# Patient Record
Sex: FEMALE | Race: WHITE | NOT HISPANIC OR LATINO | Employment: FULL TIME | URBAN - METROPOLITAN AREA
[De-identification: names, ages, dates, MRNs, and addresses within clinical notes are randomized per-mention and may not be internally consistent; named-entity substitution may affect disease eponyms.]

---

## 2017-01-02 PROBLEM — E78.2 MIXED HYPERLIPIDEMIA: Status: ACTIVE | Noted: 2017-01-02

## 2017-09-29 ENCOUNTER — RADIANT APPOINTMENT (OUTPATIENT)
Dept: MAMMOGRAPHY | Facility: CLINIC | Age: 47
End: 2017-09-29
Payer: COMMERCIAL

## 2017-09-29 DIAGNOSIS — Z12.31 VISIT FOR SCREENING MAMMOGRAM: ICD-10-CM

## 2017-09-29 PROCEDURE — G0202 SCR MAMMO BI INCL CAD: HCPCS | Mod: TC

## 2017-12-12 ENCOUNTER — TELEPHONE (OUTPATIENT)
Dept: FAMILY MEDICINE | Facility: CLINIC | Age: 47
End: 2017-12-12

## 2017-12-12 NOTE — TELEPHONE ENCOUNTER
Rosa coming in on 12/19/17 for annual physical.  She wants her glucose tested, however provider deems it necessary (A 1c, vs regular glucose level).  Her concern is making sure this is done, as well as coded correctly.  Her insurance will cover all labs as long as they are considered preventative.   Her appt is at 07:30 and she does plan on coming in fasting.    Thanks  Asuncion Huitron RN  FNA

## 2017-12-12 NOTE — TELEPHONE ENCOUNTER
Will check her glucose.  Her last one was normal (less than 100) so an A1c would not be necessary and not covered by insurance because she does not have a diagnosis of abnormal fasting glucose.

## 2017-12-19 ENCOUNTER — OFFICE VISIT (OUTPATIENT)
Dept: FAMILY MEDICINE | Facility: CLINIC | Age: 47
End: 2017-12-19
Payer: COMMERCIAL

## 2017-12-19 VITALS
DIASTOLIC BLOOD PRESSURE: 68 MMHG | HEIGHT: 68 IN | TEMPERATURE: 97.9 F | OXYGEN SATURATION: 98 % | BODY MASS INDEX: 25.31 KG/M2 | RESPIRATION RATE: 16 BRPM | HEART RATE: 72 BPM | SYSTOLIC BLOOD PRESSURE: 110 MMHG | WEIGHT: 167 LBS

## 2017-12-19 DIAGNOSIS — Z30.41 ORAL CONTRACEPTIVE USE: ICD-10-CM

## 2017-12-19 DIAGNOSIS — E78.2 MIXED HYPERLIPIDEMIA: ICD-10-CM

## 2017-12-19 DIAGNOSIS — Z00.00 ROUTINE GENERAL MEDICAL EXAMINATION AT A HEALTH CARE FACILITY: Primary | ICD-10-CM

## 2017-12-19 DIAGNOSIS — E03.9 ACQUIRED HYPOTHYROIDISM: Chronic | ICD-10-CM

## 2017-12-19 LAB
ANION GAP SERPL CALCULATED.3IONS-SCNC: 11 MMOL/L (ref 3–14)
BUN SERPL-MCNC: 15 MG/DL (ref 7–30)
CALCIUM SERPL-MCNC: 8.8 MG/DL (ref 8.5–10.1)
CHLORIDE SERPL-SCNC: 109 MMOL/L (ref 94–109)
CHOLEST SERPL-MCNC: 203 MG/DL
CO2 SERPL-SCNC: 20 MMOL/L (ref 20–32)
CREAT SERPL-MCNC: 0.59 MG/DL (ref 0.52–1.04)
GFR SERPL CREATININE-BSD FRML MDRD: >90 ML/MIN/1.7M2
GLUCOSE SERPL-MCNC: 85 MG/DL (ref 70–99)
HDLC SERPL-MCNC: 56 MG/DL
LDLC SERPL CALC-MCNC: 115 MG/DL
NONHDLC SERPL-MCNC: 147 MG/DL
POTASSIUM SERPL-SCNC: 4 MMOL/L (ref 3.4–5.3)
SODIUM SERPL-SCNC: 140 MMOL/L (ref 133–144)
TRIGL SERPL-MCNC: 161 MG/DL
TSH SERPL DL<=0.005 MIU/L-ACNC: 1.96 MU/L (ref 0.4–4)

## 2017-12-19 PROCEDURE — 99396 PREV VISIT EST AGE 40-64: CPT | Performed by: PHYSICIAN ASSISTANT

## 2017-12-19 PROCEDURE — 36415 COLL VENOUS BLD VENIPUNCTURE: CPT | Performed by: PHYSICIAN ASSISTANT

## 2017-12-19 PROCEDURE — 80061 LIPID PANEL: CPT | Performed by: PHYSICIAN ASSISTANT

## 2017-12-19 PROCEDURE — 84443 ASSAY THYROID STIM HORMONE: CPT | Performed by: PHYSICIAN ASSISTANT

## 2017-12-19 PROCEDURE — 80048 BASIC METABOLIC PNL TOTAL CA: CPT | Performed by: PHYSICIAN ASSISTANT

## 2017-12-19 RX ORDER — LEVOTHYROXINE SODIUM 137 UG/1
137 TABLET ORAL DAILY
Qty: 90 TABLET | Refills: 4 | Status: SHIPPED | OUTPATIENT
Start: 2017-12-19 | End: 2018-12-09

## 2017-12-19 NOTE — PROGRESS NOTES
Lab letter printed and signed.  Message comments below:  - Your lab results look great; everything is normal.  - Your cholesterol is high but the American Heart Association does not recommend starting a medication to lower this at this time.  We will recheck next year.  - Your metabolic panel shows:  normal electrolytes (sodium, potassium, calcium), kidney function (creatinine and GFR) and fasting blood sugar.  - Your TSH was normal, indicating that you are on the correct dose of thyroid medication.  I sent additional refills of your current medication dose to your pharmacy.

## 2017-12-19 NOTE — MR AVS SNAPSHOT
After Visit Summary   12/19/2017    Rosa Urbina    MRN: [id number]           Patient Information     Date Of Birth          1970        Visit Information        Provider Department      12/19/2017 7:30 AM Sondra Tran PA-C [facility]        Today's Diagnoses     Routine general medical examination at a health care facility    -  1    Acquired hypothyroidism        Mixed hyperlipidemia        Oral contraceptive use        Need for prophylactic vaccination and inoculation against influenza          Care Instructions      Preventive Health Recommendations  Female Ages 40 to 49    Yearly exam:     See your health care provider every year in order to  1. Review health changes.   2. Discuss preventive care.    3. Review your medicines if your doctor prescribed any.      Get a Pap test every three years (unless you have an abnormal result and your provider advises testing more often).      If you get Pap tests with HPV test, you only need to test every 5 years, unless you have an abnormal result. You do not need a Pap test if your uterus was removed (hysterectomy) and you have not had cancer.      You should be tested each year for STDs (sexually transmitted diseases), if you're at risk.       Ask your doctor if you should have a mammogram.      Have a colonoscopy (test for colon cancer) if someone in your family has had colon cancer or polyps before age 50.       Have a cholesterol test every 5 years.       Have a diabetes test (fasting glucose) after age 45. If you are at risk for diabetes, you should have this test every 3 years.    Shots: Get a flu shot each year. Get a tetanus shot every 10 years.     Nutrition:     Eat at least 5 servings of fruits and vegetables each day.    Eat whole-grain bread, whole-wheat pasta and brown rice instead of white grains and rice.    Talk to your provider about Calcium and Vitamin D.     Lifestyle    Exercise  "at least 150 minutes a week (an average of 30 minutes a day, 5 days a week). This will help you control your weight and prevent disease.    Limit alcohol to one drink per day.    No smoking.     Wear sunscreen to prevent skin cancer.    See your dentist every six months for an exam and cleaning.          Follow-ups after your visit        Who to contact     If you have questions or need follow up information about today's clinic visit or your schedule please contact [facility] directly at [phone].  Normal or non-critical lab and imaging results will be communicated to you by Synforahart, letter or phone within 4 business days after the clinic has received the results. If you do not hear from us within 7 days, please contact the clinic through MobiTVt or phone. If you have a critical or abnormal lab result, we will notify you by phone as soon as possible.  Submit refill requests through What's Trending or call your pharmacy and they will forward the refill request to us. Please allow 3 business days for your refill to be completed.          Additional Information About Your Visit        What's Trending Information     What's Trending lets you send messages to your doctor, view your test results, renew your prescriptions, schedule appointments and more. To sign up, go to [email or web address] . Click on \"Log in\" on the left side of the screen, which will take you to the Welcome page. Then click on \"Sign up Now\" on the right side of the page.     You will be asked to enter the access code listed below, as well as some personal information. Please follow the directions to create your username and password.     Your access code is: [email or web address]  Expires: 3/19/2018  7:52 AM     Your access code will  in 90 days. If you need help or a new code, please call your [facility] or [phone].        Care EveryWhere ID     This is your Care EveryWhere ID. This could be used by other organizations to access " "your [place] medical records  [id number]        Your Vitals Were     Pulse Temperature Respirations Height Pulse Oximetry BMI (Body Mass Index)    72 97.9  F (36.6  C) (Tympanic) 16 5' 7.5\" (1.715 m) 98% 25.77 kg/m2       Blood Pressure from Last 3 Encounters:   12/19/17 110/68   12/30/16 130/78   03/09/16 109/65    Weight from Last 3 Encounters:   12/19/17 167 lb (75.8 kg)   12/30/16 164 lb (74.4 kg)   10/30/15 147 lb (66.7 kg)              We Performed the Following     Basic metabolic panel     Lipid panel reflex to direct LDL Fasting     TSH with free T4 reflex        Primary Care Provider Fax #    Physician No Ref-Primary [phone]       No address on file        Equal Access to Services     EARLINE TRAN : Sarai Jones, washira oquendo, oumou kaalgraciela alcala, ted nieves . So [facility] [phone].    ATENCIÓN: Si habla español, tiene a soler disposición servicios gratuitos de asistencia lingüística. Maryann al [phone].    We comply with applicable federal civil rights laws and [place] laws. We do not discriminate on the basis of race, color, national origin, age, disability, sex, sexual orientation, or gender identity.            Thank you!     Thank you for choosing [facility]  for your care. Our goal is always to provide you with excellent care. Hearing back from our patients is one way we can continue to improve our services. Please take a few minutes to complete the written survey that you may receive in the mail after your visit with us. Thank you!             Your Updated Medication List - Protect others around you: Learn how to safely use, store and throw away your medicines at www.disposemymeds.org.          This list is accurate as of: 12/19/17  7:52 AM.  Always use your most recent med list.                   Brand Name Dispense Instructions for use Diagnosis    DESOGEN 0.15-30 MG-MCG per tablet   Generic drug:  " desogestrel-ethinyl estradiol     3 Package    Take 1 tablet by mouth daily    Oral contraceptive use       levothyroxine 137 MCG tablet    SYNTHROID/LEVOTHROID    90 tablet    Take 1 tablet (137 mcg) by mouth daily    Acquired hypothyroidism

## 2017-12-19 NOTE — PROGRESS NOTES
SUBJECTIVE:   CC: Rosa Urbina is an 47 year old woman who presents for preventive health visit.     Healthy Habits:    Do you get at least three servings of calcium containing foods daily (dairy, green leafy vegetables, etc.)? Maybe 2-3-lactose intolerant     Amount of exercise or daily activities, outside of work: last month before traveling was doing 3 times per week    Problems taking medications regularly No    Medication side effects: No    Have you had an eye exam in the past two years? yes    Do you see a dentist twice per year? yes    Do you have sleep apnea, excessive snoring or daytime drowsiness?yes      Didn't think she took her thyroid medication this morning.  May have taken it twice today.      Had mammogram yearly, mother had breast cancer before age 60.  Still taking OCP, periods have normal but have not been.        Today's PHQ-2 Score:   PHQ-2 ( 1999 Pfizer) 12/19/2017 12/30/2016   Q1: Little interest or pleasure in doing things 0 0   Q2: Feeling down, depressed or hopeless 0 0   PHQ-2 Score 0 0         Abuse: Current or Past(Physical, Sexual or Emotional)- No  Do you feel safe in your environment - Yes  Social History   Substance Use Topics     Smoking status: Never Smoker     Smokeless tobacco: Never Used     Alcohol use Yes      Comment: rarely     If you drink alcohol do you typically have >3 drinks per day or >7 drinks per week? No                     Reviewed orders with patient.  Reviewed health maintenance and updated orders accordingly - Yes  BP Readings from Last 3 Encounters:   12/19/17 110/68   12/30/16 130/78   03/09/16 109/65    Wt Readings from Last 3 Encounters:   12/19/17 167 lb (75.8 kg)   12/30/16 164 lb (74.4 kg)   10/30/15 147 lb (66.7 kg)               Recent Labs   Lab Test  12/30/16   0758  10/30/15   0812  12/12/14   0809  12/19/13   0741  12/10/13   0949   LDL  130*  93  92  136*  ORDERED AS FUTURE   HDL  57  51  43*  54  ORDERED AS FUTURE   TRIG  163*  159*   "187*  147  ORDERED AS FUTURE   ALT   --    --    --   20  ORDERED AS FUTURE   CR   --    --    --   0.90  ORDERED AS FUTURE   GFRESTIMATED   --    --    --   68  ORDERED AS FUTURE   GFRESTBLACK   --    --    --   82  ORDERED AS FUTURE   POTASSIUM   --    --    --   3.6  ORDERED AS FUTURE   TSH  2.73  0.53  1.70  5.41*  ORDERED AS FUTURE        Patient under age 50, mutual decision reflected in health maintenance.        Pertinent mammograms are reviewed under the imaging tab.  History of abnormal Pap smear:   NO - age 30-65 PAP every 5 years with negative HPV co-testing recommended  Last 3 Pap and HPV Results:   PAP / HPV 12/12/2014   PAP NIL       Reviewed and updated as needed this visit by clinical staff  Tobacco  Allergies  Meds  Problems  Med Hx  Surg Hx  Fam Hx  Soc Hx          Reviewed and updated as needed this visit by Provider  Tobacco  Allergies  Problems  Med Hx  Surg Hx  Fam Hx  Soc Hx         ROS:C: NEGATIVE for fever, chills, change in weight  I: NEGATIVE for worrisome rashes, moles or lesions  E: NEGATIVE for vision changes or irritation  ENT: NEGATIVE for ear, mouth and throat problems  R: NEGATIVE for significant cough or SOB  B: NEGATIVE for masses, tenderness or discharge  CV: NEGATIVE for chest pain, palpitations or peripheral edema  GI: NEGATIVE for nausea, abdominal pain, heartburn, or change in bowel habits  : NEGATIVE for unusual urinary or vaginal symptoms. Periods are regular.  M: NEGATIVE for significant arthralgias or myalgia  N: NEGATIVE for weakness, dizziness or paresthesias  E: NEGATIVE for temperature intolerance, skin/hair changes  P: NEGATIVE for changes in mood or affect    OBJECTIVE:   /68  Pulse 72  Temp 97.9  F (36.6  C) (Tympanic)  Resp 16  Ht 5' 7.5\" (1.715 m)  Wt 167 lb (75.8 kg)  SpO2 98%  BMI 25.77 kg/m2  EXAM:  GENERAL: healthy, alert and no distress  EYES: Eyes grossly normal to inspection, PERRL and conjunctivae and sclerae normal  HENT: ear " canals and TM's normal, nose and mouth without ulcers or lesions  NECK: no adenopathy, no asymmetry, masses, or scars and thyroid normal to palpation  RESP: lungs clear to auscultation - no rales, rhonchi or wheezes  BREAST: normal without masses, tenderness or nipple discharge and no palpable axillary masses or adenopathy  CV: regular rate and rhythm, normal S1 S2, no S3 or S4, no murmur, click or rub, no peripheral edema and peripheral pulses strong  ABDOMEN: soft, nontender, no hepatosplenomegaly, no masses and bowel sounds normal   (female): normal female external genitalia, normal urethral meatus, vaginal mucosa pink, moist, well rugated, and normal cervix/adnexa/uterus without masses or discharge  MS: no gross musculoskeletal defects noted, no edema  SKIN: no suspicious lesions or rashes  NEURO: Normal strength and tone, mentation intact and speech normal  PSYCH: mentation appears normal, affect normal/bright    ASSESSMENT/PLAN:       ICD-10-CM    1. Routine general medical examination at a health care facility Z00.00 Basic metabolic panel     Lipid panel reflex to direct LDL Fasting   2. Acquired hypothyroidism E03.9 TSH with free T4 reflex   3. Mixed hyperlipidemia E78.2    4. Oral contraceptive use Z30.41 DESOGEN 0.15-30 MG-MCG per tablet   5. Need for prophylactic vaccination and inoculation against influenza Z23        May need to check hormones for menopause next year, periods are still pretty regular on the pill.  [place]CP [place] so does not need refill.    Declines flu shot.    Thinks she may have taken 2 of her thyroid medications this am so if TSH is low she would rather we recheck it in a week than change her dose.  Discussed 1 extra dose is not likely to change her labs.  Will put in refills when labs return.    COUNSELING:   Reviewed preventive health counseling, as reflected in patient instructions       reports that she has never smoked. She has never used smokeless tobacco.    Estimated body  "mass index is 25.77 kg/(m^2) as calculated from the following:    Height as of this encounter: 5' 7.5\" (1.715 m).    Weight as of this encounter: 167 lb (75.8 kg).   Weight management plan: : -30 minutes of exercise 5 days a week (walking, jogging, housework, biking).  - Limit portion sizes and avoid eating out.  -Eat at least 5 servings of fruits and vegetables daily.   -Eat whole-grain bread, whole-wheat pasta and brown rice instead of white grains and rice.      Counseling Resources:  ATP IV Guidelines  Pooled Cohorts Equation Calculator  Breast Cancer Risk Calculator  FRAX Risk Assessment  ICSI Preventive Guidelines  Dietary Guidelines for Americans, 2010  USDA's MyPlate  ASA Prophylaxis  Lung CA Screening    Sondra Tran PA-C  [facility]  "

## 2017-12-19 NOTE — NURSING NOTE
"Chief Complaint   Patient presents with     Physical       Initial /68  Pulse 72  Temp 97.9  F (36.6  C) (Tympanic)  Resp 16  Ht 5' 7.5\" (1.715 m)  Wt 167 lb (75.8 kg)  SpO2 98%  BMI 25.77 kg/m2 Estimated body mass index is 25.77 kg/(m^2) as calculated from the following:    Height as of this encounter: 5' 7.5\" (1.715 m).    Weight as of this encounter: 167 lb (75.8 kg).  Medication Reconciliation: complete    "

## 2017-12-19 NOTE — LETTER
December 19, 2017      Rosa Urbina  [address]  [place] [address]        Dear ,    We are writing to inform you of your test results.    - Your lab results look great; everything is normal.  - Your cholesterol is high but the American Heart Association does not recommend starting a medication to lower this at this time.  We will recheck next year.  - Your metabolic panel shows:  normal electrolytes (sodium, potassium, calcium), kidney function (creatinine and GFR) and fasting blood sugar.  - Your TSH was normal, indicating that you are on the correct dose of thyroid medication.  I sent additional refills of your current medication dose to your pharmacy.      Resulted Orders   Basic metabolic panel   Result Value Ref Range    Sodium 140 133 - 144 mmol/L    Potassium 4.0 3.4 - 5.3 mmol/L    Chloride 109 94 - 109 mmol/L    Carbon Dioxide 20 20 - 32 mmol/L    Anion Gap 11 3 - 14 mmol/L    Glucose 85 70 - 99 mg/dL      Comment:      Fasting specimen    Urea Nitrogen 15 7 - 30 mg/dL    Creatinine 0.59 0.52 - 1.04 mg/dL    GFR Estimate >90 >60 mL/min/1.7m2      Comment:      Non  GFR Calc    GFR Estimate If Black >90 >60 mL/min/1.7m2      Comment:       GFR Calc    Calcium 8.8 8.5 - 10.1 mg/dL   Lipid panel reflex to direct LDL Fasting   Result Value Ref Range    Cholesterol 203 (H) <200 mg/dL      Comment:      Desirable:       <200 mg/dl    Triglycerides 161 (H) <150 mg/dL      Comment:      Borderline high:  150-199 mg/dl  High:             200-499 mg/dl  Very high:       >499 mg/dl  Fasting specimen      HDL Cholesterol 56 >49 mg/dL    LDL Cholesterol Calculated 115 (H) <100 mg/dL      Comment:      Above desirable:  100-129 mg/dl  Borderline High:  130-159 mg/dL  High:             160-189 mg/dL  Very high:       >189 mg/dl      Non HDL Cholesterol 147 (H) <130 mg/dL      Comment:      Above Desirable:  130-159 mg/dl  Borderline high:  160-189  mg/dl  High:             190-219 mg/dl  Very high:       >219 mg/dl     TSH with free T4 reflex   Result Value Ref Range    TSH 1.96 0.40 - 4.00 mU/L       If you have any questions or concerns, please call the clinic at the number listed above.       Sincerely,        Sondra Tran PA-C

## 2018-12-03 ENCOUNTER — HEALTH MAINTENANCE LETTER (OUTPATIENT)
Age: 48
End: 2018-12-03

## 2018-12-07 ENCOUNTER — OFFICE VISIT (OUTPATIENT)
Dept: FAMILY MEDICINE | Facility: CLINIC | Age: 48
End: 2018-12-07
Payer: COMMERCIAL

## 2018-12-07 VITALS
SYSTOLIC BLOOD PRESSURE: 110 MMHG | HEART RATE: 76 BPM | BODY MASS INDEX: 25.31 KG/M2 | DIASTOLIC BLOOD PRESSURE: 66 MMHG | WEIGHT: 164 LBS | OXYGEN SATURATION: 98 % | TEMPERATURE: 98 F

## 2018-12-07 DIAGNOSIS — Z00.00 ROUTINE GENERAL MEDICAL EXAMINATION AT A HEALTH CARE FACILITY: Primary | ICD-10-CM

## 2018-12-07 DIAGNOSIS — Z12.31 VISIT FOR SCREENING MAMMOGRAM: ICD-10-CM

## 2018-12-07 DIAGNOSIS — Z12.11 SCREENING FOR COLON CANCER: ICD-10-CM

## 2018-12-07 DIAGNOSIS — E78.2 MIXED HYPERLIPIDEMIA: ICD-10-CM

## 2018-12-07 DIAGNOSIS — E03.9 ACQUIRED HYPOTHYROIDISM: ICD-10-CM

## 2018-12-07 LAB
ANION GAP SERPL CALCULATED.3IONS-SCNC: 7 MMOL/L (ref 3–14)
BUN SERPL-MCNC: 13 MG/DL (ref 7–30)
CALCIUM SERPL-MCNC: 8.8 MG/DL (ref 8.5–10.1)
CHLORIDE SERPL-SCNC: 107 MMOL/L (ref 94–109)
CHOLEST SERPL-MCNC: 182 MG/DL
CO2 SERPL-SCNC: 24 MMOL/L (ref 20–32)
CREAT SERPL-MCNC: 0.73 MG/DL (ref 0.52–1.04)
GFR SERPL CREATININE-BSD FRML MDRD: 85 ML/MIN/1.7M2
GLUCOSE SERPL-MCNC: 85 MG/DL (ref 70–99)
HDLC SERPL-MCNC: 46 MG/DL
LDLC SERPL CALC-MCNC: 99 MG/DL
NONHDLC SERPL-MCNC: 136 MG/DL
POTASSIUM SERPL-SCNC: 3.3 MMOL/L (ref 3.4–5.3)
SODIUM SERPL-SCNC: 138 MMOL/L (ref 133–144)
TRIGL SERPL-MCNC: 183 MG/DL
TSH SERPL DL<=0.005 MIU/L-ACNC: 2.52 MU/L (ref 0.4–4)

## 2018-12-07 PROCEDURE — 84443 ASSAY THYROID STIM HORMONE: CPT | Performed by: PHYSICIAN ASSISTANT

## 2018-12-07 PROCEDURE — 80048 BASIC METABOLIC PNL TOTAL CA: CPT | Performed by: PHYSICIAN ASSISTANT

## 2018-12-07 PROCEDURE — 36415 COLL VENOUS BLD VENIPUNCTURE: CPT | Performed by: PHYSICIAN ASSISTANT

## 2018-12-07 PROCEDURE — 80061 LIPID PANEL: CPT | Performed by: PHYSICIAN ASSISTANT

## 2018-12-07 PROCEDURE — 99396 PREV VISIT EST AGE 40-64: CPT | Performed by: PHYSICIAN ASSISTANT

## 2018-12-07 NOTE — PROGRESS NOTES
SUBJECTIVE:   CC: Rosa Urbina is an 48 year old woman who presents for preventive health visit.     Physical   Annual:     Getting at least 3 servings of Calcium per day:  Yes    Bi-annual eye exam:  Yes    Dental care twice a year:  Yes    Sleep apnea or symptoms of sleep apnea:  None    Diet:  Regular (no restrictions)    Frequency of exercise:  1 day/week    Duration of exercise:  Less than 15 minutes    Taking medications regularly:  Yes    Medication side effects:  Not applicable    Additional concerns today:  No    PHQ-2 Total Score: 0    Can call in future if needs OCP refilled.    Today's PHQ-2 Score:   PHQ-2 ( 1999 Pfizer) 12/7/2018   Q1: Little interest or pleasure in doing things 0   Q2: Feeling down, depressed or hopeless 0   PHQ-2 Score 0   Q1: Little interest or pleasure in doing things Not at all   Q2: Feeling down, depressed or hopeless Not at all   PHQ-2 Score 0       Abuse: Current or Past(Physical, Sexual or Emotional)- No  Do you feel safe in your environment? Yes    Social History   Substance Use Topics     Smoking status: Never Smoker     Smokeless tobacco: Never Used     Alcohol use Yes      Comment: rarely     Alcohol Use 12/7/2018   If you drink alcohol do you typically have greater than 3 drinks per day OR greater than 7 drinks per week? No   No flowsheet data found.    Reviewed orders with patient.  Reviewed health maintenance and updated orders accordingly - Yes  BP Readings from Last 3 Encounters:   12/07/18 110/66   12/19/17 110/68   12/30/16 130/78    Wt Readings from Last 3 Encounters:   12/07/18 164 lb (74.4 kg)   12/19/17 167 lb (75.8 kg)   12/30/16 164 lb (74.4 kg)             Recent Labs   Lab Test  12/19/17   0803  12/30/16   0758  10/30/15   0812   12/19/13   0741  12/10/13   0949   LDL  115*  130*  93   < >  136*  ORDERED AS FUTURE   HDL  56  57  51   < >  54  ORDERED AS FUTURE   TRIG  161*  163*  159*   < >  147  ORDERED AS FUTURE   ALT   --    --    --    --   20   ORDERED AS FUTURE   CR  0.59   --    --    --   0.90  ORDERED AS FUTURE   GFRESTIMATED  >90   --    --    --   68  ORDERED AS FUTURE   GFRESTBLACK  >90   --    --    --   82  ORDERED AS FUTURE   POTASSIUM  4.0   --    --    --   3.6  ORDERED AS FUTURE   TSH  1.96  2.73  0.53   < >  5.41*  ORDERED AS FUTURE    < > = values in this interval not displayed.        Mammogram Screening: Patient over age 50, mutual decision to screen reflected in health maintenance.    Pertinent mammograms are reviewed under the imaging tab.  History of abnormal Pap smear: NO - age 30-65 PAP every 5 years with negative HPV co-testing recommended  PAP / HPV 12/12/2014   PAP NIL     Reviewed and updated as needed this visit by clinical staff  Tobacco  Allergies  Meds  Problems  Med Hx  Surg Hx  Fam Hx  Soc Hx          Reviewed and updated as needed this visit by Provider  Tobacco  Allergies  Meds  Problems  Med Hx  Surg Hx  Fam Hx  Soc Hx           Review of Systems  CONSTITUTIONAL: NEGATIVE for fever, chills, change in weight  INTEGUMENTARU/SKIN: NEGATIVE for worrisome rashes, moles or lesions  EYES: NEGATIVE for vision changes or irritation  ENT: NEGATIVE for ear, mouth and throat problems  RESP: NEGATIVE for significant cough or SOB  BREAST: NEGATIVE for masses, tenderness or discharge  CV: NEGATIVE for chest pain, palpitations or peripheral edema  GI: NEGATIVE for nausea, abdominal pain, heartburn, or change in bowel habits  : NEGATIVE for unusual urinary or vaginal symptoms. Periods are regular.  MUSCULOSKELETAL: NEGATIVE for significant arthralgias or myalgia  NEURO: NEGATIVE for weakness, dizziness or paresthesias  PSYCHIATRIC: NEGATIVE for changes in mood or affect     OBJECTIVE:   /66 (Cuff Size: Adult Large)  Pulse 76  Temp 98  F (36.7  C) (Tympanic)  Wt 164 lb (74.4 kg)  SpO2 98%  BMI 25.31 kg/m2  Physical Exam  GENERAL: healthy, alert and no distress  EYES: Eyes grossly normal to inspection, PERRL and  "conjunctivae and sclerae normal  HENT: ear canals and TM's normal, nose and mouth without ulcers or lesions  NECK: no adenopathy, no asymmetry, masses, or scars and thyroid normal to palpation  RESP: lungs clear to auscultation - no rales, rhonchi or wheezes  BREAST: normal without masses, tenderness or nipple discharge and no palpable axillary masses or adenopathy  CV: regular rate and rhythm, normal S1 S2, no S3 or S4, no murmur, click or rub, no peripheral edema and peripheral pulses strong  ABDOMEN: soft, nontender, no hepatosplenomegaly, no masses and bowel sounds normal  MS: no gross musculoskeletal defects noted, no edema  SKIN: no suspicious lesions or rashes  NEURO: Normal strength and tone, mentation intact and speech normal  PSYCH: mentation appears normal, affect normal/bright    ASSESSMENT/PLAN:       ICD-10-CM    1. Routine general medical examination at a health care facility Z00.00 Lipid panel reflex to direct LDL Fasting     Basic metabolic panel   2. Acquired hypothyroidism E03.9 TSH with free T4 reflex   3. Mixed hyperlipidemia E78.2    4. Screening for colon cancer Z12.11    5. Visit for screening mammogram Z12.31 MA SCREENING DIGITAL BILAT - Future  (s+30)   Contact info given for [facility] and [facility].  Pt will call in a few months and schedule.  She is worried about doing this but knows she needs to.    Will schedule mammogram.    Will stop OCP 2 months before physical next year so we can check hr FSH and LH to see if she is menopausal.    Will send thyroid medication refills once labs return.  Will call pt if dose change is indicated.      COUNSELING:  Reviewed preventive health counseling, as reflected in patient instructions    BP Readings from Last 1 Encounters:   12/07/18 110/66     Estimated body mass index is 25.31 kg/(m^2) as calculated from the following:    Height as of 12/19/17: 5' 7.5\" (1.715 m).    Weight as of this encounter: 164 lb (74.4 kg).      Weight management plan: : " -30 minutes of exercise 5 days a week (walking, jogging, housework, biking).  - Limit portion sizes and avoid eating out.  -Eat at least 5 servings of fruits and vegetables daily.   -Eat whole-grain bread, whole-wheat pasta and brown rice instead of white grains and rice.       reports that she has never smoked. She has never used smokeless tobacco.      Counseling Resources:  ATP IV Guidelines  Pooled Cohorts Equation Calculator  Breast Cancer Risk Calculator  FRAX Risk Assessment  ICSI Preventive Guidelines  Dietary Guidelines for Americans, 2010  USDA's MyPlate  ASA Prophylaxis  Lung CA Screening    Sondra Tran PA-C  [facility]

## 2018-12-07 NOTE — MR AVS SNAPSHOT
After Visit Summary   12/7/2018    Rosa Urbina    MRN: [id number]           Patient Information     Date Of Birth          1970        Visit Information        Provider Department      12/7/2018 7:30 AM Sondra Tran PA-C [facility]        Today's Diagnoses     Routine general medical examination at a health care facility    -  1    Acquired hypothyroidism        Mixed hyperlipidemia        Screening for colon cancer        Visit for screening mammogram          Care Instructions      Preventive Health Recommendations  Female Ages 50 - 64    Yearly exam: See your health care provider every year in order to  o Review health changes.   o Discuss preventive care.    o Review your medicines if your doctor has prescribed any.      Get a Pap test every three years (unless you have an abnormal result and your provider advises testing more often).    If you get Pap tests with HPV test, you only need to test every 5 years, unless you have an abnormal result.     You do not need a Pap test if your uterus was removed (hysterectomy) and you have not had cancer.    You should be tested each year for STDs (sexually transmitted diseases) if you're at risk.     Have a mammogram every 1 to 2 years.    Have a colonoscopy at age 50, or have a yearly FIT test (stool test). These exams screen for colon cancer.      Have a cholesterol test every 5 years, or more often if advised.    Have a diabetes test (fasting glucose) every three years. If you are at risk for diabetes, you should have this test more often.     If you are at risk for osteoporosis (brittle bone disease), think about having a bone density scan (DEXA).    Shots: Get a flu shot each year. Get a tetanus shot every 10 years.    Nutrition:     Eat at least 5 servings of fruits and vegetables each day.    Eat whole-grain bread, whole-wheat pasta and brown rice instead of white grains and rice.    Get  "adequate Calcium and Vitamin D.     Lifestyle    Exercise at least 150 minutes a week (30 minutes a day, 5 days a week). This will help you control your weight and prevent disease.    Limit alcohol to one drink per day.    No smoking.     Wear sunscreen to prevent skin cancer.     See your dentist every six months for an exam and cleaning.    See your eye doctor every 1 to 2 years.            Follow-ups after your visit        Follow-up notes from your care team     Return in about 1 year (around 12/7/2019) for Annual Exam.      Future tests that were ordered for you today     Open Future Orders        Priority Expected Expires Ordered    MA SCREENING DIGITAL BILAT - Future  (s+30) Routine  12/7/2019 12/7/2018            Who to contact     If you have questions or need follow up information about today's clinic visit or your schedule please contact [facility] directly at [phone].  Normal or non-critical lab and imaging results will be communicated to you by [employer], letter or phone within 4 business days after the clinic has received the results. If you do not hear from us within 7 days, please contact the clinic through [employer] or phone. If you have a critical or abnormal lab result, we will notify you by phone as soon as possible.  Submit refill requests through [employer] or call your pharmacy and they will forward the refill request to us. Please allow 3 business days for your refill to be completed.          Additional Information About Your Visit        [employer] Information     [employer] lets you send messages to your doctor, view your test results, renew your prescriptions, schedule appointments and more. To sign up, go to [email or web address] . Click on \"Log in\" on the left side of the screen, which will take you to the Welcome page. Then click on \"Sign up Now\" on the right side of the page.     You will be asked to enter the access code listed below, as well as some personal " information. Please follow the directions to create your username and password.     Your access code is: [email or web address]  Expires: 3/7/2019  7:22 AM     Your access code will  in 90 days. If you need help or a new code, please call your [facility] clinic or [phone].        Care EveryWhere ID     This is your Care EveryWhere ID. This could be used by other organizations to access your [facility] medical records  [id number]        Your Vitals Were     Pulse Temperature Pulse Oximetry BMI (Body Mass Index)          76 98  F (36.7  C) (Tympanic) 98% 25.31 kg/m2         Blood Pressure from Last 3 Encounters:   18 110/66   17 110/68   16 130/78    Weight from Last 3 Encounters:   18 164 lb (74.4 kg)   17 167 lb (75.8 kg)   16 164 lb (74.4 kg)              We Performed the Following     Basic metabolic panel     Lipid panel reflex to direct LDL Fasting     TSH with free T4 reflex        Primary Care Provider Office Phone # Fax #    Sondra Tran PA-C [phone]       [address] [address]        Equal Access to Services     EARLINE TRAN : Hadii aad ku hadasho Soomaali, waaxda luqadaha, qaybta kaalmada adeegyada, waxay idiin haydarryln goldie nieves . So [facility] [phone].    ATENCIÓN: Si habla español, tiene a soler disposición servicios gratuitos de asistencia lingüística. Llame al [phone].    We comply with applicable federal civil rights laws and [place] laws. We do not discriminate on the basis of race, color, national origin, age, disability, sex, sexual orientation, or gender identity.            Thank you!     Thank you for choosing [facility]  for your care. Our goal is always to provide you with excellent care. Hearing back from our patients is one way we can continue to improve our services. Please take a few minutes to complete the written survey that you may receive in the mail  after your visit with us. Thank you!             Your Updated Medication List - Protect others around you: Learn how to safely use, store and throw away your medicines at www.disposemymeds.org.          This list is accurate as of 12/7/18  8:42 AM.  Always use your most recent med list.                   Brand Name Dispense Instructions for use Diagnosis    DESOGEN 0.15-30 MG-MCG tablet   Generic drug:  desogestrel-ethinyl estradiol     3 Package    Take 1 tablet by mouth daily    Oral contraceptive use       levothyroxine 137 MCG tablet    SYNTHROID/LEVOTHROID    90 tablet    Take 1 tablet (137 mcg) by mouth daily    Acquired hypothyroidism

## 2018-12-09 RX ORDER — LEVOTHYROXINE SODIUM 137 UG/1
137 TABLET ORAL DAILY
Qty: 90 TABLET | Refills: 4 | Status: SHIPPED | OUTPATIENT
Start: 2018-12-09 | End: 2019-03-06

## 2018-12-10 NOTE — RESULT ENCOUNTER NOTE
Lab letter printed and signed.  Message comments below:  - Your Cholesterol is normal.  - Your metabolic panel shows:  decreased potassium, between 3.1 and 3.4. Eat a diet high in potassium, i.e., bananas, potatoes, almonds for the next 1-2 weeks and a normal fasting blood sugar level (<100)  - Your TSH was normal, indicating that you are on the correct dose of thyroid medication.  I sent additional refills of your current medication dose to your pharmacy.

## 2019-03-05 DIAGNOSIS — E03.9 ACQUIRED HYPOTHYROIDISM: ICD-10-CM

## 2019-03-05 NOTE — TELEPHONE ENCOUNTER
"levothyroxine (SYNTHROID/LEVOTHROID) 137 MCG tablet  Last Written Prescription Date:  12/09/18  Last Fill Quantity: 90,  # refills: 4   Last office visit: 12/7/2018 with prescribing provider:  12/07/18   Future Office Visit:    Requested Prescriptions   Pending Prescriptions Disp Refills     levothyroxine (SYNTHROID/LEVOTHROID) 137 MCG tablet 90 tablet 4     Sig: Take 1 tablet (137 mcg) by mouth daily    Thyroid Protocol Passed - 3/5/2019 11:34 AM       Passed - Patient is 12 years or older       Passed - Recent (12 mo) or future (30 days) visit within the authorizing provider's specialty    Patient had office visit in the last 12 months or has a visit in the next 30 days with authorizing provider or within the authorizing provider's specialty.  See \"Patient Info\" tab in inbasket, or \"Choose Columns\" in Meds & Orders section of the refill encounter.             Passed - Medication is active on med list       Passed - Normal TSH on file in past 12 months    Recent Labs   Lab Test 12/07/18  0806   TSH 2.52             Passed - No active pregnancy on record    If patient is pregnant or has had a positive pregnancy test, please check TSH.         Passed - No positive pregnancy test in past 12 months    If patient is pregnant or has had a positive pregnancy test, please check TSH.            "

## 2019-03-05 NOTE — TELEPHONE ENCOUNTER
Reason for Call:  Medication or medication refill:    Do you use a [facility]?  Name of the pharmacy and phone [facility]    Name of the medication requested: levothyroxine (SYNTHROID/LEVOTHROID) 137 MCG tablet    Other request:     Can we leave a detailed message on this number? YES    Phone number patient can be reached at: Cell number on file:    Telephone Information:   Mobile [phone]       Best Time:     Call taken on 3/5/2019 at 11:33 AM by GINNY HERNANDEZ

## 2019-03-06 RX ORDER — LEVOTHYROXINE SODIUM 137 UG/1
137 TABLET ORAL DAILY
Qty: 90 TABLET | Refills: 2 | Status: SHIPPED | OUTPATIENT
Start: 2019-03-06 | End: 2019-12-05

## 2019-09-11 ENCOUNTER — TELEPHONE (OUTPATIENT)
Dept: FAMILY MEDICINE | Facility: CLINIC | Age: 49
End: 2019-09-11

## 2019-09-11 NOTE — TELEPHONE ENCOUNTER
Panel Management Review      Patient has the following on her problem list: None      Composite cancer screening  Chart review shows that this patient is due/due soon for the following Pap Smear, Mammogram and Colonoscopy  Summary:    Patient is due/failing the following:   COLONOSCOPY, MAMMOGRAM     Action needed:   Patient needs referral/order: mammo and colonoscopy    Type of outreach:    Sent letter.    Questions for provider review:    None

## 2019-09-11 NOTE — LETTER
September 11, 2019    Rosa Urbina  [address]  [place] [address]    Dear Theodore Lee cares about your health and your health plan.  I have reviewed your medical conditions, medication list and lab results, and am making recommendations based on this review to better manage your health.    You are in particular need of attention regarding:  -Breast Cancer Screening  -Colon Cancer Screening    I am recommending that you:     -schedule a MAMMOGRAM which is due.    1 in 8 women will develop invasive breast cancer during her lifetime and it is the most common non-skin cancer in American women.  EARLY detection, new treatments, and a better understanding of the disease have increased survival rates - the 5 year survival rate in the 1960s was 63% and today it is close to 90%.    If you are under/uninsured, we recommend you contact the [facility]. They offer mammograms at no charge or on a sliding fee charge. You can schedule with them at [phone]. Please have them send us the results.      Please disregard this reminder if you have had this exam elsewhere within the last year.  It would be helpful for us to have a copy of your mammogram report in your file so that we can best coordinate your care - please contact us with when your test was done so we can update your record.             -schedule a COLONOSCOPY to look for colon cancer (due every 10 years or 5 years in higher risk situations.)        Colon cancer is now the second leading cause of cancer-related deaths in the [place] for both men and women and there are over 130,000 new cases and 50,000 deaths per year from colon cancer.  Colonoscopies can prevent 90-95% of these deaths.  Problem lesions can be removed before they ever become cancer.  This test is not only looking for cancer, but also getting rid of precancerious lesions.    If you are under/uninsured, we recommend you contact the [facility] program. [facility] is a free colorectal cancer screening program that provides colonoscopies for eligible under/uninsured [place] men and women. If you are interested in receiving a free colonoscopy, please call [facility] at [phone] (mention code [facility]) to see if you re eligible.      If you do not wish to do a colonoscopy or cannot afford to do one, at this time, there is another option. It is called a FIT test or Fecal Immunochemical Occult Blood Test (take home stool sample kit).  It does not replace the colonoscopy for colorectal cancer screening, but it can detect hidden bleeding in the lower colon.  It does need to be repeated every year and if a positive result is obtained, you would be referred for a colonoscopy.          If you have completed either one of these tests at another facility, please call with the details of when and where the tests were done and if they were normal or not. Or have the records sent to our clinic so that we can best coordinate your care.      Please call us at the [facility] [employer] location:  [phone] or use [employer] to address the above recommendations.     Thank you for trusting [facility].  We appreciate the opportunity to serve you and look forward to supporting your healthcare in the future.    If you have (or plan to have) any of these tests done at a facility other than a [facility] or a [facility], please have the results sent to the [facility] location noted above.      Best Regards,    CHRISTELLE Peralta

## 2019-12-05 ENCOUNTER — OFFICE VISIT (OUTPATIENT)
Dept: FAMILY MEDICINE | Facility: CLINIC | Age: 49
End: 2019-12-05
Payer: COMMERCIAL

## 2019-12-05 VITALS
DIASTOLIC BLOOD PRESSURE: 70 MMHG | WEIGHT: 160 LBS | HEIGHT: 68 IN | SYSTOLIC BLOOD PRESSURE: 110 MMHG | TEMPERATURE: 98.2 F | BODY MASS INDEX: 24.25 KG/M2 | RESPIRATION RATE: 16 BRPM | HEART RATE: 71 BPM

## 2019-12-05 DIAGNOSIS — E03.9 ACQUIRED HYPOTHYROIDISM: ICD-10-CM

## 2019-12-05 DIAGNOSIS — Z12.31 VISIT FOR SCREENING MAMMOGRAM: ICD-10-CM

## 2019-12-05 DIAGNOSIS — Z00.00 ROUTINE GENERAL MEDICAL EXAMINATION AT A HEALTH CARE FACILITY: Primary | ICD-10-CM

## 2019-12-05 DIAGNOSIS — Z71.89 ADVANCED CARE PLANNING/COUNSELING DISCUSSION: ICD-10-CM

## 2019-12-05 DIAGNOSIS — Z12.11 SCREENING FOR COLON CANCER: ICD-10-CM

## 2019-12-05 DIAGNOSIS — N95.1 PERIMENOPAUSAL: ICD-10-CM

## 2019-12-05 DIAGNOSIS — Z12.4 PAP SMEAR FOR CERVICAL CANCER SCREENING: ICD-10-CM

## 2019-12-05 LAB
ANION GAP SERPL CALCULATED.3IONS-SCNC: 6 MMOL/L (ref 3–14)
BUN SERPL-MCNC: 19 MG/DL (ref 7–30)
CALCIUM SERPL-MCNC: 9 MG/DL (ref 8.5–10.1)
CHLORIDE SERPL-SCNC: 110 MMOL/L (ref 94–109)
CHOLEST SERPL-MCNC: 202 MG/DL
CO2 SERPL-SCNC: 24 MMOL/L (ref 20–32)
CREAT SERPL-MCNC: 0.65 MG/DL (ref 0.52–1.04)
GFR SERPL CREATININE-BSD FRML MDRD: >90 ML/MIN/{1.73_M2}
GLUCOSE SERPL-MCNC: 82 MG/DL (ref 70–99)
HDLC SERPL-MCNC: 51 MG/DL
LDLC SERPL CALC-MCNC: 117 MG/DL
NONHDLC SERPL-MCNC: 151 MG/DL
POTASSIUM SERPL-SCNC: 3.7 MMOL/L (ref 3.4–5.3)
SODIUM SERPL-SCNC: 140 MMOL/L (ref 133–144)
TRIGL SERPL-MCNC: 171 MG/DL
TSH SERPL DL<=0.005 MIU/L-ACNC: 1.78 MU/L (ref 0.4–4)

## 2019-12-05 PROCEDURE — 80061 LIPID PANEL: CPT | Performed by: PHYSICIAN ASSISTANT

## 2019-12-05 PROCEDURE — G0145 SCR C/V CYTO,THINLAYER,RESCR: HCPCS | Performed by: PHYSICIAN ASSISTANT

## 2019-12-05 PROCEDURE — 36415 COLL VENOUS BLD VENIPUNCTURE: CPT | Performed by: PHYSICIAN ASSISTANT

## 2019-12-05 PROCEDURE — 80048 BASIC METABOLIC PNL TOTAL CA: CPT | Performed by: PHYSICIAN ASSISTANT

## 2019-12-05 PROCEDURE — 84443 ASSAY THYROID STIM HORMONE: CPT | Performed by: PHYSICIAN ASSISTANT

## 2019-12-05 PROCEDURE — 87624 HPV HI-RISK TYP POOLED RSLT: CPT | Performed by: PHYSICIAN ASSISTANT

## 2019-12-05 PROCEDURE — 99396 PREV VISIT EST AGE 40-64: CPT | Performed by: PHYSICIAN ASSISTANT

## 2019-12-05 RX ORDER — LEVOTHYROXINE SODIUM 137 UG/1
137 TABLET ORAL DAILY
Qty: 90 TABLET | Refills: 3 | Status: SHIPPED | OUTPATIENT
Start: 2019-12-05 | End: 2020-11-18

## 2019-12-05 ASSESSMENT — MIFFLIN-ST. JEOR: SCORE: 1381.32

## 2019-12-05 NOTE — LETTER
December 5, 2019      Rosa Urbina  [address]  [place] [address]        Dear ,    We are writing to inform you of your test results.    - Your lab results look great; everything is normal.  - Your Cholesterol is normal.  - Your metabolic panel shows:  normal electrolytes (sodium, potassium, calcium), kidney function (creatinine and GFR) and fasting blood sugar.  - Your TSH was normal, indicating that you are on the correct dose of thyroid medication.  I sent additional refills of your current medication dose to your pharmacy.     Resulted Orders   Lipid panel reflex to direct LDL Fasting   Result Value Ref Range    Cholesterol 202 (H) <200 mg/dL      Comment:      Desirable:       <200 mg/dl    Triglycerides 171 (H) <150 mg/dL      Comment:      Borderline high:  150-199 mg/dl  High:             200-499 mg/dl  Very high:       >499 mg/dl  Fasting specimen      HDL Cholesterol 51 >49 mg/dL    LDL Cholesterol Calculated 117 (H) <100 mg/dL      Comment:      Above desirable:  100-129 mg/dl  Borderline High:  130-159 mg/dL  High:             160-189 mg/dL  Very high:       >189 mg/dl      Non HDL Cholesterol 151 (H) <130 mg/dL      Comment:      Above Desirable:  130-159 mg/dl  Borderline high:  160-189 mg/dl  High:             190-219 mg/dl  Very high:       >219 mg/dl     Basic metabolic panel   Result Value Ref Range    Sodium 140 133 - 144 mmol/L    Potassium 3.7 3.4 - 5.3 mmol/L    Chloride 110 (H) 94 - 109 mmol/L    Carbon Dioxide 24 20 - 32 mmol/L    Anion Gap 6 3 - 14 mmol/L    Glucose 82 70 - 99 mg/dL      Comment:      Fasting specimen    Urea Nitrogen 19 7 - 30 mg/dL    Creatinine 0.65 0.52 - 1.04 mg/dL    GFR Estimate >90 >60 mL/min/[1.73_m2]      Comment:      Non  GFR Calc  Starting 12/18/2018, serum creatinine based estimated GFR (eGFR) will be   calculated using the Chronic Kidney Disease Epidemiology Collaboration   (CKD-EPI) equation.      GFR  Estimate If Black >90 >60 mL/min/[1.73_m2]      Comment:       GFR Calc  Starting 12/18/2018, serum creatinine based estimated GFR (eGFR) will be   calculated using the Chronic Kidney Disease Epidemiology Collaboration   (CKD-EPI) equation.      Calcium 9.0 8.5 - 10.1 mg/dL   TSH with free T4 reflex   Result Value Ref Range    TSH 1.78 0.40 - 4.00 mU/L       If you have any questions or concerns, please call the clinic at the number listed above.       Sincerely,        Sondra Tran PA-C

## 2019-12-05 NOTE — RESULT ENCOUNTER NOTE
Lab letter printed and signed.  Message comments below:  - Your lab results look great; everything is normal.  - Your Cholesterol is normal.  - Your metabolic panel shows:  normal electrolytes (sodium, potassium, calcium), kidney function (creatinine and GFR) and fasting blood sugar.  - Your TSH was normal, indicating that you are on the correct dose of thyroid medication.  I sent additional refills of your current medication dose to your pharmacy.

## 2019-12-05 NOTE — PROGRESS NOTES
SUBJECTIVE:   CC: Rosa Urbina is an 49 year old woman who presents for preventive health visit.     Healthy Habits:     Getting at least 3 servings of Calcium per day:  Yes    Bi-annual eye exam:  Yes    Dental care twice a year:  Yes    Sleep apnea or symptoms of sleep apnea:  None    Diet:  Regular (no restrictions)    Frequency of exercise:  1 day/week    Duration of exercise:  Less than 15 minutes    Taking medications regularly:  Yes    Medication side effects:  None    PHQ-2 Total Score: 0    Additional concerns today:  No      Today's PHQ-2 Score:   PHQ-2 ( 1999 [employer]) 12/5/2019   Q1: Little interest or pleasure in doing things 0   Q2: Feeling down, depressed or hopeless 0   PHQ-2 Score 0   Q1: Little interest or pleasure in doing things Not at all   Q2: Feeling down, depressed or hopeless Not at all   PHQ-2 Score 0     Abuse: Current or Past(Physical, Sexual or Emotional)- No  Do you feel safe in your environment? Yes    Social History     Tobacco Use     Smoking status: Never Smoker     Smokeless tobacco: Never Used   Substance Use Topics     Alcohol use: Yes     Comment: rarely     If you drink alcohol do you typically have >3 drinks per day or >7 drinks per week? No    Alcohol Use 12/5/2019   Prescreen: >3 drinks/day or >7 drinks/week? No   Prescreen: >3 drinks/day or >7 drinks/week? -   No flowsheet data found.    Reviewed orders with patient.  Reviewed health maintenance and updated orders accordingly - Yes  BP Readings from Last 3 Encounters:   12/05/19 110/70   12/07/18 110/66   12/19/17 110/68    Wt Readings from Last 3 Encounters:   12/05/19 72.6 kg (160 lb)   12/07/18 74.4 kg (164 lb)   12/19/17 75.8 kg (167 lb)            Recent Labs   Lab Test 12/07/18  0806 12/19/17  0803 12/30/16  0758  12/19/13  0741 12/10/13  0949   LDL 99 115* 130*   < > 136* ORDERED AS FUTURE   HDL 46* 56 57   < > 54 ORDERED AS FUTURE   TRIG 183* 161* 163*   < > 147 ORDERED AS FUTURE   ALT  --   --   --   --   "20 ORDERED AS FUTURE   CR 0.73 0.59  --   --  0.90 ORDERED AS FUTURE   GFRESTIMATED 85 >90  --   --  68 ORDERED AS FUTURE   GFRESTBLACK >90 >90  --   --  82 ORDERED AS FUTURE   POTASSIUM 3.3* 4.0  --   --  3.6 ORDERED AS FUTURE   TSH 2.52 1.96 2.73   < > 5.41* ORDERED AS FUTURE    < > = values in this interval not displayed.        Mammogram Screening: Patient over age 50, mutual decision to screen reflected in health maintenance.    Pertinent mammograms are reviewed under the imaging tab.  History of abnormal Pap smear: NO - age 30-65 PAP every 5 years with negative HPV co-testing recommended  PAP / HPV 12/12/2014   PAP NIL     Reviewed and updated as needed this visit by clinical staff  Tobacco  Allergies  Meds  Problems  Med Hx  Surg Hx  Fam Hx  Soc Hx          Reviewed and updated as needed this visit by Provider  Tobacco  Allergies  Meds  Problems  Med Hx  Surg Hx  Fam Hx  Soc Hx           Review of Systems  CONSTITUTIONAL: NEGATIVE for fever, chills, change in weight  INTEGUMENTARU/SKIN: NEGATIVE for worrisome rashes, moles or lesions  EYES: NEGATIVE for vision changes or irritation  ENT: NEGATIVE for ear, mouth and throat problems  RESP: NEGATIVE for significant cough or SOB  BREAST: NEGATIVE for masses, tenderness or discharge  CV: NEGATIVE for chest pain, palpitations or peripheral edema  GI: NEGATIVE for nausea, abdominal pain, heartburn, or change in bowel habits  : NEGATIVE for unusual urinary or vaginal symptoms. Periods are regular.  MUSCULOSKELETAL: NEGATIVE for significant arthralgias or myalgia  NEURO: NEGATIVE for weakness, dizziness or paresthesias  PSYCHIATRIC: NEGATIVE for changes in mood or affect     OBJECTIVE:   /70 (Cuff Size: Adult Regular)   Pulse 71   Temp 98.2  F (36.8  C) (Tympanic)   Resp 16   Ht 1.715 m (5' 7.5\")   Wt 72.6 kg (160 lb)   LMP 11/25/2019   BMI 24.69 kg/m    Physical Exam  GENERAL: healthy, alert and no distress  EYES: Eyes grossly normal to " inspection, PERRL and conjunctivae and sclerae normal  HENT: ear canals and TM's normal, nose and mouth without ulcers or lesions  NECK: no adenopathy, no asymmetry, masses, or scars and thyroid normal to palpation  RESP: lungs clear to auscultation - no rales, rhonchi or wheezes  BREAST: normal without masses, tenderness or nipple discharge and no palpable axillary masses or adenopathy  CV: regular rate and rhythm, normal S1 S2, no S3 or S4, no murmur, click or rub, no peripheral edema and peripheral pulses strong  ABDOMEN: soft, nontender, no hepatosplenomegaly, no masses and bowel sounds normal   (female): normal female external genitalia, normal urethral meatus, vaginal mucosa pink, moist, well rugated, and normal cervix/adnexa/uterus without masses or discharge  MS: no gross musculoskeletal defects noted, no edema  SKIN: no suspicious lesions or rashes  NEURO: Normal strength and tone, mentation intact and speech normal  PSYCH: mentation appears normal, affect normal/bright    Diagnostic Test Results:  Labs reviewed in Epic    ASSESSMENT/PLAN:       ICD-10-CM    1. Routine general medical examination at a health care facility Z00.00 Lipid panel reflex to direct LDL Fasting     Basic metabolic panel   2. Screening for colon cancer Z12.11 GASTROENTEROLOGY ADULT REF PROCEDURE ONLY Other; [place] [facility] [phone]   3. Visit for screening mammogram Z12.31 MA Screening Digital Bilateral   4. Acquired hypothyroidism E03.9 TSH with free T4 reflex   5. Pap smear for cervical cancer screening Z12.4 Pap imaged thin layer screen with HPV - recommended age 30 - 65 years (select HPV order below)     HPV High Risk Types DNA Cervical   6. Advanced care planning/counseling discussion Z71.89    7. Perimenopausal N95.1 Lutropin     Follicle stimulating hormone   Will schedule mammogram and colonoscopy.  Flu shot done.  Put in future lab orders for LH and FSH if pt decides to go off her OCP for two months she can make a lab only  "appointment  to get this done.    Will send thyroid medication refills once labs return.  Will call pt if dose change is indicated.     COUNSELING:  Reviewed preventive health counseling, as reflected in patient instructions    Estimated body mass index is 24.69 kg/m  as calculated from the following:    Height as of this encounter: 1.715 m (5' 7.5\").    Weight as of this encounter: 72.6 kg (160 lb).         reports that she has never smoked. She has never used smokeless tobacco.      Counseling Resources:  ATP IV Guidelines  Pooled Cohorts Equation Calculator  Breast Cancer Risk Calculator  FRAX Risk Assessment  ICSI Preventive Guidelines  Dietary Guidelines for Americans, 2010  USDA's MyPlate  ASA Prophylaxis  Lung CA Screening    Sondra Tran PA-C  [facility]  "

## 2019-12-09 LAB
COPATH REPORT: NORMAL
PAP: NORMAL

## 2019-12-11 LAB
FINAL DIAGNOSIS: NORMAL
HPV HR 12 DNA CVX QL NAA+PROBE: NEGATIVE
HPV16 DNA SPEC QL NAA+PROBE: NEGATIVE
HPV18 DNA SPEC QL NAA+PROBE: NEGATIVE
SPECIMEN DESCRIPTION: NORMAL
SPECIMEN SOURCE CVX/VAG CYTO: NORMAL

## 2019-12-20 DIAGNOSIS — Z12.31 VISIT FOR SCREENING MAMMOGRAM: ICD-10-CM

## 2019-12-20 PROCEDURE — 77067 SCR MAMMO BI INCL CAD: CPT | Mod: TC

## 2019-12-20 PROCEDURE — 77063 BREAST TOMOSYNTHESIS BI: CPT

## 2020-01-31 ENCOUNTER — TRANSFERRED RECORDS (OUTPATIENT)
Dept: HEALTH INFORMATION MANAGEMENT | Facility: CLINIC | Age: 50
End: 2020-01-31

## 2020-03-18 ENCOUNTER — E-VISIT (OUTPATIENT)
Dept: FAMILY MEDICINE | Facility: CLINIC | Age: 50
End: 2020-03-18
Payer: COMMERCIAL

## 2020-03-18 DIAGNOSIS — R30.0 DYSURIA: Primary | ICD-10-CM

## 2020-03-18 PROCEDURE — 99421 OL DIG E/M SVC 5-10 MIN: CPT | Performed by: PHYSICIAN ASSISTANT

## 2020-03-19 RX ORDER — SULFAMETHOXAZOLE/TRIMETHOPRIM 800-160 MG
1 TABLET ORAL 2 TIMES DAILY
Qty: 14 TABLET | Refills: 0 | Status: SHIPPED | OUTPATIENT
Start: 2020-03-19 | End: 2020-08-10

## 2020-03-19 NOTE — PATIENT INSTRUCTIONS
Thank you for choosing us for your care. I have placed an order for a prescription so that you can start treatment. View your full visit summary for details by clicking on the link below. Your pharmacist will able to address any questions you may have about the medication.     If you re not feeling better within 2-3 days, please schedule an appointment.  You can schedule an appointment right here in [employer], or call [phone]  If the visit is for the same symptoms as your e-visit, we ll refund the cost of your e-visit if seen within seven days.      Urinary Tract Infections in Women    Urinary tract infections (UTIs) are most often caused by bacteria. These bacteria enter the urinary tract. The bacteria may come from outside the body. Or they may travel from the skin outside the rectum or vagina into the urethra. Female anatomy makes it easy for bacteria from the bowel to enter a woman s urinary tract, which is the most common source of UTI. This means women develop UTIs more often than men. Pain in or around the urinary tract is a common UTI symptom. But the only way to know for sure if you have a UTI for the healthcare provider to test your urine. The two tests that may be done are the urinalysis and urine culture.  Types of UTIs    Cystitis. A bladder infection (cystitis) is the most common UTI in women. You may have urgent or frequent urination. You may also have pain, burning when you urinate, and bloody urine.    Urethritis. This is an inflamed urethra, which is the tube that carries urine from the bladder to outside the body. You may have lower stomach or back pain. You may also have urgent or frequent urination.    Pyelonephritis. This is a kidney infection. If not treated, it can be serious and damage your kidneys. In severe cases, you may need to stay in the hospital. You may have a fever and lower back pain.  Medicines to treat a UTI  Most UTIs are treated with antibiotics. These kill the bacteria.  The length of time you need to take them depends on the type of infection. It may be as short as 3 days. If you have repeated UTIs, you may need a low-dose antibiotic for several months. Take antibiotics exactly as directed. Don t stop taking them until all of the medicine is gone. If you stop taking the antibiotic too soon, the infection may not go away. You may also develop a resistance to the antibiotic. This can make it much harder to treat.  Lifestyle changes to treat and prevent UTIs  The lifestyle changes below will help get rid of your UTI. They may also help prevent future UTIs.    Drink plenty of fluids. This includes water, juice, or other caffeine-free drinks. Fluids help flush bacteria out of your body.    Empty your bladder. Always empty your bladder when you feel the urge to urinate. And always urinate before going to sleep. Urine that stays in your bladder can lead to infection. Try to urinate before and after sex as well.    Practice good personal hygiene. Wipe yourself from front to back after using the toilet. This helps keep bacteria from getting into the urethra.    Use condoms during sex. These help prevent UTIs caused by sexually transmitted bacteria. Also don't use spermicides during sex. These can increase the risk for UTIs. Choose other forms of birth control instead. For women who tend to get UTIs after sex, a low-dose of a preventive antibiotic may be used. Be sure to discuss this option with your healthcare provider.    Follow up with your healthcare provider as directed. He or she may test to make sure the infection has cleared. If needed, more treatment may be started.  Date Last Reviewed: 1/1/2017 2000-2019 [employer]. [address] [place] [address], [place] [address]. All rights reserved. This information is not intended as a substitute for professional medical care. Always follow your healthcare professional's instructions.

## 2020-08-11 ENCOUNTER — OFFICE VISIT (OUTPATIENT)
Dept: FAMILY MEDICINE | Facility: CLINIC | Age: 50
End: 2020-08-11
Payer: COMMERCIAL

## 2020-08-11 VITALS
SYSTOLIC BLOOD PRESSURE: 128 MMHG | BODY MASS INDEX: 25.19 KG/M2 | RESPIRATION RATE: 14 BRPM | OXYGEN SATURATION: 99 % | HEART RATE: 85 BPM | WEIGHT: 160.5 LBS | DIASTOLIC BLOOD PRESSURE: 66 MMHG | TEMPERATURE: 98.6 F | HEIGHT: 67 IN

## 2020-08-11 DIAGNOSIS — M79.642 BILATERAL HAND PAIN: Primary | ICD-10-CM

## 2020-08-11 DIAGNOSIS — Z78.0 POST-MENOPAUSAL: ICD-10-CM

## 2020-08-11 DIAGNOSIS — M79.641 BILATERAL HAND PAIN: Primary | ICD-10-CM

## 2020-08-11 DIAGNOSIS — R76.8 ANA POSITIVE: ICD-10-CM

## 2020-08-11 LAB
CRP SERPL-MCNC: <2.9 MG/L (ref 0–8)
ERYTHROCYTE [SEDIMENTATION RATE] IN BLOOD BY WESTERGREN METHOD: 9 MM/H (ref 0–30)
FSH SERPL-ACNC: 87.2 IU/L
LH SERPL-ACNC: 41.9 IU/L

## 2020-08-11 PROCEDURE — 85652 RBC SED RATE AUTOMATED: CPT | Performed by: PHYSICIAN ASSISTANT

## 2020-08-11 PROCEDURE — 86038 ANTINUCLEAR ANTIBODIES: CPT | Performed by: PHYSICIAN ASSISTANT

## 2020-08-11 PROCEDURE — 83001 ASSAY OF GONADOTROPIN (FSH): CPT | Performed by: PHYSICIAN ASSISTANT

## 2020-08-11 PROCEDURE — 86039 ANTINUCLEAR ANTIBODIES (ANA): CPT | Performed by: PHYSICIAN ASSISTANT

## 2020-08-11 PROCEDURE — 86140 C-REACTIVE PROTEIN: CPT | Performed by: PHYSICIAN ASSISTANT

## 2020-08-11 PROCEDURE — 99214 OFFICE O/P EST MOD 30 MIN: CPT | Performed by: PHYSICIAN ASSISTANT

## 2020-08-11 PROCEDURE — 83002 ASSAY OF GONADOTROPIN (LH): CPT | Performed by: PHYSICIAN ASSISTANT

## 2020-08-11 PROCEDURE — 86200 CCP ANTIBODY: CPT | Performed by: PHYSICIAN ASSISTANT

## 2020-08-11 PROCEDURE — 36415 COLL VENOUS BLD VENIPUNCTURE: CPT | Performed by: PHYSICIAN ASSISTANT

## 2020-08-11 RX ORDER — IBUPROFEN 800 MG/1
800 TABLET, FILM COATED ORAL EVERY 8 HOURS PRN
Qty: 30 TABLET | Refills: 1 | Status: CANCELLED | OUTPATIENT
Start: 2020-08-11

## 2020-08-11 ASSESSMENT — MIFFLIN-ST. JEOR: SCORE: 1375.65

## 2020-08-11 NOTE — PATIENT INSTRUCTIONS
Patient Education     What is Rheumatoid Arthritis?  Rheumatoid arthritis (RA) is a disease that affects the synovium, the lining of the joints. This causes pain, swelling, and stiffness. Left untreated, rheumatoid arthritis may damage joints so badly that they no longer function. This disease appears most often in young-adult to middle-age women.      Rheumatoid arthritis affects the lining (synovium) of the joints, sometimes causing swelling.   What causes RA?  RA is an autoimmune disorder. This means the immune system, which normally protects the body, actually causes harm. In RA, the immune system attacks the joint lining. The reason for this is unknown. Researchers believe it is a combination of genes (what is inherited from parents) and environment (for example, having infections from viruses or bacteria). Also, people who smoke or are overweight have an increased risk of developing RA.  What are the symptoms of RA?  Rheumatoid arthritis can affect most joints. The hands, wrists, elbows, knees, and balls of the feet are common sites. This disease often affects the same joint on both sides of the body. Symptoms may include:    Tender, swollen inflamed joints. They may also look red and feel warm.    Stiff joints. Long periods of rest or using a joint too long or too hard can make stiffness worse. Morning stiffness lasting more than 30 minutes is very common.    Joints that have lost normal shape and motion.    Feeling tired.  How is RA diagnosed?  To diagnose rheumatoid arthritis, your healthcare provider will ask you a lot of questions about your health history and current symptoms. He or she will examine you, paying close attention to your joints. You will also have blood tests and X-rays. Your provider will likely recommend that you see a rheumatologist, an arthritis specialist.  Date Last Reviewed: 6/1/2018 2000-2019 [employer]. [address] [place] [address], [place] [address]. All rights  reserved. This information is not intended as a substitute for professional medical care. Always follow your healthcare professional's instructions.

## 2020-08-11 NOTE — PROGRESS NOTES
Subjective     Rosa Urbina is a 49 year old female who presents to clinic today for the following health issues:    HPI       Musculoskeletal problem/pain      Duration: Intermittent and ongoing    Description  Location: Bilateral hand pain    Intensity:  Varies according to day and activity    Accompanying signs and symptoms: tingling    History  Previous similar problem: no   Previous evaluation:  none    Precipitating or alleviating factors:  Trauma or overuse: YES--types  Aggravating factors include: none    Therapies tried and outcome: heat  Recent Labs   Lab Test 12/05/19  0747 12/07/18  0806 12/19/17  0803  12/19/13  0741 12/10/13  0949   * 99 115*   < > 136* ORDERED AS FUTURE   HDL 51 46* 56   < > 54 ORDERED AS FUTURE   TRIG 171* 183* 161*   < > 147 ORDERED AS FUTURE   ALT  --   --   --   --  20 ORDERED AS FUTURE   CR 0.65 0.73 0.59  --  0.90 ORDERED AS FUTURE   GFRESTIMATED >90 85 >90  --  68 ORDERED AS FUTURE   GFRESTBLACK >90 >90 >90  --  82 ORDERED AS FUTURE   POTASSIUM 3.7 3.3* 4.0  --  3.6 ORDERED AS FUTURE   TSH 1.78 2.52 1.96   < > 5.41* ORDERED AS FUTURE    < > = values in this interval not displayed.      BP Readings from Last 3 Encounters:   08/11/20 128/66   12/05/19 110/70   12/07/18 110/66    Wt Readings from Last 3 Encounters:   08/11/20 72.8 kg (160 lb 8 oz)   12/05/19 72.6 kg (160 lb)   12/07/18 74.4 kg (164 lb)              Reviewed and updated as needed this visit by Provider  Tobacco  Allergies  Meds  Problems  Med Hx  Surg Hx  Fam Hx         Additional complaints: amenorrhea     HPI additional notes: Rosa presents today with   Chief Complaint   Patient presents with     Musculoskeletal Problem     hand pain, stiffness   Family history of lupus.  Would like to have labs checked         Review of Systems   C: Negative for fever, chills, recent change in weight  Skin: Negative for worrisome rashes or lesions  Resp: Negative for significant cough or SOB  CV: Negative  "for chest pain or peripheral edema  GI: Negative for nausea, abdominal pain, heartburn, or change in bowel habits  MS: Positive for bilateral hand pain  NEURO: NEGATIVE for numbness, tingling, or radicular pain.  P: Negative for changes in mood or affect  ROS otherwise negative.        Objective    /66 (BP Location: Right arm, Patient Position: Sitting, Cuff Size: Adult Regular)   Pulse 85   Temp 98.6  F (37  C) (Tympanic)   Resp 14   Ht 1.702 m (5' 7\")   Wt 72.8 kg (160 lb 8 oz)   LMP 05/01/2019   SpO2 99%   Breastfeeding No   BMI 25.14 kg/m    Body mass index is 25.14 kg/m .  Physical Exam   GENERAL: healthy, alert, in no acute distress  HENT: Mucous mebranes moist.  MS: tenderness to palpation of right thumb, mild tenderness to dip joints and MCP joint FROM of all fingers bilaterally  SKIN: no suspicious lesions, no rashes  PSYCH: Alert and oriented times 3;  Able to articulate logical thoughts. Affect is normal.    Diagnostic test results:    Results for orders placed or performed in visit on 08/11/20   Erythrocyte sedimentation rate auto     Status: None   Result Value Ref Range    Sed Rate 9 0 - 30 mm/h            Assessment & Plan       ICD-10-CM    1. Bilateral hand pain  M79.641 Cyclic Citrullinated Peptide Antibody IgG    M79.642 Erythrocyte sedimentation rate auto     CRP inflammation     Anti Nuclear Daya IgG by IFA with Reflex   2. Post-menopausal  Z78.0 Lutropin     Follicle stimulating hormone     Will get labs today and see if needs  to see rheumatology or orthopedics or PT.    Pt has not had a period since Dec, will check labs to see if in menopause.      Please see patient instructions for treatment details.    Return in about 2 weeks (around 8/25/2020) for Recheck if not improving, phone call to clinic.    Sondra Tran PA-C  [facility]      "

## 2020-08-12 LAB
ANA PAT SER IF-IMP: ABNORMAL
ANA SER QL IF: POSITIVE
ANA TITR SER IF: ABNORMAL {TITER}
CCP AB SER IA-ACNC: 1 U/ML

## 2020-08-13 ENCOUNTER — MYC MEDICAL ADVICE (OUTPATIENT)
Dept: FAMILY MEDICINE | Facility: CLINIC | Age: 50
End: 2020-08-13

## 2020-08-13 DIAGNOSIS — M79.641 BILATERAL HAND PAIN: Primary | ICD-10-CM

## 2020-08-13 DIAGNOSIS — M79.642 BILATERAL HAND PAIN: Primary | ICD-10-CM

## 2020-08-13 DIAGNOSIS — R76.8 ANA POSITIVE: ICD-10-CM

## 2020-08-13 NOTE — TELEPHONE ENCOUNTER
Please see patient mychart message.     Referral request for: [facility]    Fax: [phone]    Please fax order

## 2020-08-13 NOTE — PROGRESS NOTES
Called pt with SACHI positive results.  Will have her see rheumatology.  [facility] and send me a MyC message for where she would like to be referred.

## 2020-09-15 ENCOUNTER — TRANSFERRED RECORDS (OUTPATIENT)
Dept: HEALTH INFORMATION MANAGEMENT | Facility: CLINIC | Age: 50
End: 2020-09-15

## 2020-09-15 LAB
AST SERPL-CCNC: 36 U/L (ref 5–34)
CREAT SERPL-MCNC: 0.61 MG/DL (ref 0.5–1.3)

## 2020-09-28 ENCOUNTER — OFFICE VISIT (OUTPATIENT)
Dept: OPHTHALMOLOGY | Facility: CLINIC | Age: 50
End: 2020-09-28
Payer: COMMERCIAL

## 2020-09-28 DIAGNOSIS — H00.14 CHALAZION OF LEFT UPPER EYELID: Primary | ICD-10-CM

## 2020-09-28 PROCEDURE — 92002 INTRM OPH EXAM NEW PATIENT: CPT | Performed by: STUDENT IN AN ORGANIZED HEALTH CARE EDUCATION/TRAINING PROGRAM

## 2020-09-28 ASSESSMENT — SLIT LAMP EXAM - LIDS: COMMENTS: NORMAL

## 2020-09-28 ASSESSMENT — VISUAL ACUITY
OS_PH_CC: 20/25
METHOD: SNELLEN - LINEAR
CORRECTION_TYPE: GLASSES
OD_CC: 20/20
OS_CC+: -1
OS_CC: 20/40

## 2020-09-28 ASSESSMENT — EXTERNAL EXAM - RIGHT EYE: OD_EXAM: NORMAL

## 2020-09-28 ASSESSMENT — EXTERNAL EXAM - LEFT EYE: OS_EXAM: NORMAL

## 2020-09-28 NOTE — PATIENT INSTRUCTIONS
Chalazion  There are tiny oil glands in the upper eyelid near the eyelashes. They help lubricate the eyes.   If these glands become blocked, a small hard lump forms in the upper eyelid, called a chalazion.   The lump can take several weeks to grow, causing pain and tenderness, sensitivity to light, and increased tearing.    Home Care  1. Apply a hot compress for 15 minutes at least 4 times a day. Use a microwaveable pack filled with dry,uncooked rice, gel beads, etc.  This will reduce the swelling and soften the hardened oils blocking the duct.   2. Massage the area gently after applying the compress to promote drainage.  Do not try to pop or squeeze the chalazion.  3. Once a day, with eyes closed, clean your eyelids with baby shampoo to help  reduce clogging of the duct, as well as help prevent recurrences.  If the bump does not resolve with hot packing after a few weeks, we may be able to  incise and drain the bump or inject steroids into the bump.  Cindy Munoz MD  [phone]

## 2020-09-28 NOTE — PROGRESS NOTES
Current Eye Medications:  E-mycin ointment once today.  Hot compresses twice a day yesterday and today.       Subjective:  Starting on 9-25-20 she noticed some discomfort and a discreet bump on her left upper eyelid.  Over the weekend her left upper eyelid became more red and swollen.  The bump is uncomfortable to touch, and overall discomfort with blinking.  She went to a [facility] today, and was given Erythromycin ointment prescription, and was told to see an Ophthalmologist.  The Provider from the [facility] called her back and told her she needed to be seen today because she thought she may have orbital cellulitis.      She does not tolerate oral antibiotics very well.  History of slight proptosis appearance of both eyes.    No history of eye injuries or surgery.       Objective:  See Ophthalmology Exam.       Assessment:  Rosa Urbina is a 50 year old female who presents with:   Encounter Diagnosis   Name Primary?     Chalazion of left upper eyelid        Plan:  1. Apply a hot compress for 15 minutes at least 4 times a day. Use a microwaveable pack filled with dry,uncooked rice, gel beads, etc.  This will reduce the swelling and soften the hardened oils blocking the duct.   2. Massage the area gently after applying the compress to promote drainage.  Do not try to pop or squeeze the chalazion.  3. Once a day, with eyes closed, clean your eyelids with baby shampoo to help  reduce clogging of the duct, as well as help prevent recurrences.  If the bump does not resolve with hot packing after a few weeks, we may be able to incise and drain the bump or inject steroids into the bump.  Cindy Munoz MD  [phone]

## 2020-09-28 NOTE — LETTER
9/28/2020         RE: Rosa Urbina  [address]  [facility] [address]        Dear Colleague,    Thank you for referring your patient, Rosa Urbina, to the [facility]. Please see a copy of my visit note below.     Current Eye Medications:  E-mycin ointment once today.  Hot compresses twice a day yesterday and today.       Subjective:  Starting on 9-25-20 she noticed some discomfort and a discreet bump on her left upper eyelid.  Over the weekend her left upper eyelid became more red and swollen.  The bump is uncomfortable to touch, and overall discomfort with blinking.  She went to a [facility] today, and was given Erythromycin ointment prescription, and was told to see an Ophthalmologist.  The Provider from the [facility] called her back and told her she needed to be seen today because she thought she may have orbital cellulitis.      She does not tolerate oral antibiotics very well.  History of slight proptosis appearance of both eyes.    No history of eye injuries or surgery.       Objective:  See Ophthalmology Exam.       Assessment:  Rosa Urbina is a 50 year old female who presents with:   Encounter Diagnosis   Name Primary?     Chalazion of left upper eyelid        Plan:  1. Apply a hot compress for 15 minutes at least 4 times a day. Use a microwaveable pack filled with dry,uncooked rice, gel beads, etc.  This will reduce the swelling and soften the hardened oils blocking the duct.   2. Massage the area gently after applying the compress to promote drainage.  Do not try to pop or squeeze the chalazion.  3. Once a day, with eyes closed, clean your eyelids with baby shampoo to help  reduce clogging of the duct, as well as help prevent recurrences.  If the bump does not resolve with hot packing after a few weeks, we may be able to incise and drain the bump or inject steroids into the bump.  Cindy Munoz MD  [phone]        Again, thank you  for allowing me to participate in the care of your patient.        Sincerely,        Cindy Munoz MD

## 2020-10-08 ENCOUNTER — MYC MEDICAL ADVICE (OUTPATIENT)
Dept: RHEUMATOLOGY | Facility: CLINIC | Age: 50
End: 2020-10-08

## 2020-11-10 ENCOUNTER — TELEPHONE (OUTPATIENT)
Dept: OPHTHALMOLOGY | Facility: CLINIC | Age: 50
End: 2020-11-10

## 2020-11-10 NOTE — TELEPHONE ENCOUNTER
Pt called and reported that she had a stye on her right eye lid, used hot compresses and it resolved this problem , however pt reports that if she touched her right eye lid her eyeball itself feels tender to the touch and will hurt if moves eye to a given direction, pt reports no redness, change in vision, mattering or watering. I told pt to try preservative free art. Tear 4 times a day for 2 days and if problem does not improve  should call us back..

## 2020-11-18 ENCOUNTER — OFFICE VISIT (OUTPATIENT)
Dept: FAMILY MEDICINE | Facility: CLINIC | Age: 50
End: 2020-11-18
Payer: COMMERCIAL

## 2020-11-18 VITALS
HEART RATE: 89 BPM | SYSTOLIC BLOOD PRESSURE: 102 MMHG | OXYGEN SATURATION: 98 % | BODY MASS INDEX: 23.54 KG/M2 | RESPIRATION RATE: 20 BRPM | HEIGHT: 67 IN | DIASTOLIC BLOOD PRESSURE: 60 MMHG | WEIGHT: 150 LBS | TEMPERATURE: 98.2 F

## 2020-11-18 DIAGNOSIS — M79.641 BILATERAL HAND PAIN: ICD-10-CM

## 2020-11-18 DIAGNOSIS — E03.9 ACQUIRED HYPOTHYROIDISM: ICD-10-CM

## 2020-11-18 DIAGNOSIS — M79.642 BILATERAL HAND PAIN: ICD-10-CM

## 2020-11-18 DIAGNOSIS — Z00.00 ROUTINE HISTORY AND PHYSICAL EXAMINATION OF ADULT: Primary | ICD-10-CM

## 2020-11-18 LAB
ANION GAP SERPL CALCULATED.3IONS-SCNC: 2 MMOL/L (ref 3–14)
BUN SERPL-MCNC: 20 MG/DL (ref 7–30)
CALCIUM SERPL-MCNC: 9.7 MG/DL (ref 8.5–10.1)
CHLORIDE SERPL-SCNC: 107 MMOL/L (ref 94–109)
CHOLEST SERPL-MCNC: 199 MG/DL
CO2 SERPL-SCNC: 30 MMOL/L (ref 20–32)
CREAT SERPL-MCNC: 0.68 MG/DL (ref 0.52–1.04)
GFR SERPL CREATININE-BSD FRML MDRD: >90 ML/MIN/{1.73_M2}
GLUCOSE SERPL-MCNC: 89 MG/DL (ref 70–99)
HDLC SERPL-MCNC: 46 MG/DL
LDLC SERPL CALC-MCNC: 126 MG/DL
NONHDLC SERPL-MCNC: 153 MG/DL
POTASSIUM SERPL-SCNC: 3.9 MMOL/L (ref 3.4–5.3)
SODIUM SERPL-SCNC: 139 MMOL/L (ref 133–144)
T3FREE SERPL-MCNC: 3.3 PG/ML (ref 2.3–4.2)
T4 FREE SERPL-MCNC: 1.57 NG/DL (ref 0.76–1.46)
TRIGL SERPL-MCNC: 134 MG/DL
TSH SERPL DL<=0.005 MIU/L-ACNC: 0.04 MU/L (ref 0.4–4)

## 2020-11-18 PROCEDURE — 84481 FREE ASSAY (FT-3): CPT | Performed by: PHYSICIAN ASSISTANT

## 2020-11-18 PROCEDURE — 84439 ASSAY OF FREE THYROXINE: CPT | Performed by: PHYSICIAN ASSISTANT

## 2020-11-18 PROCEDURE — 99396 PREV VISIT EST AGE 40-64: CPT | Performed by: PHYSICIAN ASSISTANT

## 2020-11-18 PROCEDURE — 80048 BASIC METABOLIC PNL TOTAL CA: CPT | Performed by: PHYSICIAN ASSISTANT

## 2020-11-18 PROCEDURE — 36415 COLL VENOUS BLD VENIPUNCTURE: CPT | Performed by: PHYSICIAN ASSISTANT

## 2020-11-18 PROCEDURE — 80061 LIPID PANEL: CPT | Performed by: PHYSICIAN ASSISTANT

## 2020-11-18 PROCEDURE — 84443 ASSAY THYROID STIM HORMONE: CPT | Performed by: PHYSICIAN ASSISTANT

## 2020-11-18 RX ORDER — LEVOTHYROXINE SODIUM 137 UG/1
137 TABLET ORAL DAILY
Qty: 90 TABLET | Refills: 4 | Status: SHIPPED | OUTPATIENT
Start: 2020-11-18 | End: 2020-11-19

## 2020-11-18 ASSESSMENT — MIFFLIN-ST. JEOR: SCORE: 1326.99

## 2020-11-18 NOTE — PROGRESS NOTES
SUBJECTIVE:   CC: Rosa Urbina is an 50 year old woman who presents for preventive health visit.       Patient has been advised of split billing requirements and indicates understanding: Yes  Healthy Habits:     Getting at least 3 servings of Calcium per day:  Yes    Bi-annual eye exam:  Yes    Dental care twice a year:  Yes    Sleep apnea or symptoms of sleep apnea:  None    Diet:  Regular (no restrictions)    Frequency of exercise:  1 day/week    Duration of exercise:  Less than 15 minutes    Taking medications regularly:  Yes    Medication side effects:  None    PHQ-2 Total Score: 0    Additional concerns today:  No      Still having hand pain, told was OA by orthopedics and rheumatology.      Having hot flashes, will think about effexor and send me a message if she wants to start.    Declines td today, will get at some point.        Today's PHQ-2 Score:   PHQ-2 ( 1999 [employer]) 11/18/2020   Q1: Little interest or pleasure in doing things 0   Q2: Feeling down, depressed or hopeless 0   PHQ-2 Score 0   Q1: Little interest or pleasure in doing things Not at all   Q2: Feeling down, depressed or hopeless Not at all   PHQ-2 Score 0       Abuse: Current or Past (Physical, Sexual or Emotional) - No  Do you feel safe in your environment? Yes        Social History     Tobacco Use     Smoking status: Never Smoker     Smokeless tobacco: Never Used   Substance Use Topics     Alcohol use: Yes     Comment: rarely     If you drink alcohol do you typically have >3 drinks per day or >7 drinks per week? No    Alcohol Use 11/18/2020   Prescreen: >3 drinks/day or >7 drinks/week? No   Prescreen: >3 drinks/day or >7 drinks/week? -       Reviewed orders with patient.  Reviewed health maintenance and updated orders accordingly - Yes  BP Readings from Last 3 Encounters:   11/18/20 102/60   08/11/20 128/66   12/05/19 110/70    Wt Readings from Last 3 Encounters:   11/18/20 68 kg (150 lb)   08/11/20 72.8 kg (160 lb 8 oz)    12/05/19 72.6 kg (160 lb)                  Recent Labs   Lab Test 09/15/20 12/05/19  0747 12/07/18  0806 12/19/17  0803 12/19/13  0741 12/19/13  0741 12/10/13  0949   LDL  --  117* 99 115*   < > 136* ORDERED AS FUTURE   HDL  --  51 46* 56   < > 54 ORDERED AS FUTURE   TRIG  --  171* 183* 161*   < > 147 ORDERED AS FUTURE   ALT  --   --   --   --   --  20 ORDERED AS FUTURE   CR 0.610 0.65 0.73 0.59  --  0.90 ORDERED AS FUTURE   GFRESTIMATED  --  >90 85 >90  --  68 ORDERED AS FUTURE   GFRESTBLACK  --  >90 >90 >90  --  82 ORDERED AS FUTURE   POTASSIUM  --  3.7 3.3* 4.0  --  3.6 ORDERED AS FUTURE   TSH  --  1.78 2.52 1.96   < > 5.41* ORDERED AS FUTURE    < > = values in this interval not displayed.        Mammogram Screening: Patient over age 50, mutual decision to screen reflected in health maintenance.    Pertinent mammograms are reviewed under the imaging tab.  History of abnormal Pap smear: NO - age 30-65 PAP every 5 years with negative HPV co-testing recommended  PAP / HPV Latest Ref Rng & Units 12/5/2019 12/12/2014   PAP - NIL NIL   HPV 16 DNA NEG:Negative Negative -   HPV 18 DNA NEG:Negative Negative -   OTHER HR HPV NEG:Negative Negative -     Reviewed and updated as needed this visit by clinical staff  Tobacco  Allergies  Meds  Problems  Med Hx  Surg Hx  Fam Hx  Soc Hx          Reviewed and updated as needed this visit by Provider  Tobacco  Allergies  Meds  Problems  Med Hx  Surg Hx  Fam Hx           Review of Systems  CONSTITUTIONAL: NEGATIVE for fever, chills, change in weight  INTEGUMENTARY/SKIN: NEGATIVE for worrisome rashes, moles or lesions  EYES: NEGATIVE for vision changes or irritation  ENT: NEGATIVE for ear, mouth and throat problems  RESP: NEGATIVE for significant cough or SOB  BREAST: NEGATIVE for masses, tenderness or discharge  CV: NEGATIVE for chest pain, palpitations or peripheral edema  GI: NEGATIVE for nausea, abdominal pain, heartburn, or change in bowel habits  : NEGATIVE for  "unusual urinary or vaginal symptoms. No vaginal bleeding.  MUSCULOSKELETAL: NEGATIVE for significant arthralgias or myalgia  NEURO: NEGATIVE for weakness, dizziness or paresthesias  PSYCHIATRIC: NEGATIVE for changes in mood or affect      OBJECTIVE:   /60   Pulse 89   Temp 98.2  F (36.8  C)   Resp 20   Ht 1.708 m (5' 7.25\")   Wt 68 kg (150 lb)   LMP  (LMP Unknown)   SpO2 98%   BMI 23.32 kg/m    Physical Exam  GENERAL APPEARANCE: healthy, alert and no distress  EYES: Eyes grossly normal to inspection, PERRL and conjunctivae and sclerae normal  HENT: ear canals and TM's normal, nose and mouth without ulcers or lesions, oropharynx clear and oral mucous membranes moist  NECK: no adenopathy, no asymmetry, masses, or scars and thyroid normal to palpation  RESP: lungs clear to auscultation - no rales, rhonchi or wheezes  BREAST: normal without masses, tenderness or nipple discharge and no palpable axillary masses or adenopathy  CV: regular rate and rhythm, normal S1 S2, no S3 or S4, no murmur, click or rub, no peripheral edema and peripheral pulses strong  ABDOMEN: soft, nontender, no hepatosplenomegaly, no masses and bowel sounds normal  MS: Positive for bilateral hand pain with decreased flexion of fingers  SKIN: no suspicious lesions or rashes  NEURO: Normal strength and tone, sensory exam grossly normal, mentation intact and speech normal  PSYCH: mentation appears normal and affect normal/bright    Diagnostic Test Results:  Labs reviewed in Epic    ASSESSMENT/PLAN:       ICD-10-CM    1. Routine history and physical examination of adult  Z00.00 Lipid panel reflex to direct LDL Fasting     Basic metabolic panel   2. Acquired hypothyroidism  E03.9 levothyroxine (SYNTHROID/LEVOTHROID) 137 MCG tablet     TSH     T4, free     T3, Free   3. Bilateral hand pain  M79.641     M79.642    Unsure if will follow me to [place].    Having hot flashes, will think about effexor and send me a message if she wants to " "start.    Patient has been advised of split billing requirements and indicates understanding: Yes  COUNSELING:  Reviewed preventive health counseling, as reflected in patient instructions    Estimated body mass index is 23.32 kg/m  as calculated from the following:    Height as of this encounter: 1.708 m (5' 7.25\").    Weight as of this encounter: 68 kg (150 lb).        She reports that she has never smoked. She has never used smokeless tobacco.      Counseling Resources:  ATP IV Guidelines  Pooled Cohorts Equation Calculator  Breast Cancer Risk Calculator  BRCA-Related Cancer Risk Assessment: FHS-7 Tool  FRAX Risk Assessment  ICSI Preventive Guidelines  Dietary Guidelines for Americans, 2010  USDA's MyPlate  ASA Prophylaxis  Lung CA Screening    Sondra Tran PA-C  [facility]    "

## 2020-11-18 NOTE — PATIENT INSTRUCTIONS
"[facility] Clinic:  [facility]:  Gwen Anderson, BASSEM (same day, acute visits only)  DO Marilu Mae MD, MD Clinic:  [facility] Sinclair:  FRANK Limon PA-C Lavanya Namballa, MD    Recommend checking the [employer] Website for each clinic and clicking on \"Care Team\".  Each provider should have a little bio and maybe video where they introduce themselves.    "

## 2020-11-19 RX ORDER — LEVOTHYROXINE SODIUM 125 UG/1
137 TABLET ORAL DAILY
Qty: 90 TABLET | Refills: 0 | Status: SHIPPED | OUTPATIENT
Start: 2020-11-19 | End: 2021-01-20

## 2020-11-19 NOTE — RESULT ENCOUNTER NOTE
Scar Lee,    I just wanted to let you know that your lab results have been reviewed and are attached.    - Your Cholesterol is normal.  - Your metabolic panel shows:  normal electrolytes (sodium, potassium, calcium), kidney function (creatinine and GFR) and fasting blood sugar.  - Your TSH was LOW and your T4 was low, suggesting your thryoid medication dose may be too high.  I have sent in a lower medication dose to your pharmacy.  We should recheck your labs in 6-8 weeks to make sure we are now at the correct dose. This can be done at any [facility] if another location is more convient for you.    Please let me know if you have any questions and have a great week!    Sincerely,    Melina Tran PA-C    Family Medicine  [facility]  [address]  [place] [address]  [phone] (p)

## 2020-11-20 DIAGNOSIS — Z12.31 VISIT FOR SCREENING MAMMOGRAM: ICD-10-CM

## 2021-01-18 ENCOUNTER — TELEPHONE (OUTPATIENT)
Dept: FAMILY MEDICINE | Facility: CLINIC | Age: 51
End: 2021-01-18

## 2021-01-18 DIAGNOSIS — E03.9 ACQUIRED HYPOTHYROIDISM: ICD-10-CM

## 2021-01-18 PROCEDURE — 84439 ASSAY OF FREE THYROXINE: CPT | Performed by: PHYSICIAN ASSISTANT

## 2021-01-18 PROCEDURE — 36415 COLL VENOUS BLD VENIPUNCTURE: CPT | Performed by: PHYSICIAN ASSISTANT

## 2021-01-18 PROCEDURE — 84443 ASSAY THYROID STIM HORMONE: CPT | Performed by: PHYSICIAN ASSISTANT

## 2021-01-18 NOTE — TELEPHONE ENCOUNTER
Patient would like all prescriptions to be sent the the [facility] in [place]     [address], [place] [address]

## 2021-01-18 NOTE — TELEPHONE ENCOUNTER
Pt had her thyroid test drawn today    Please advise on script when results are back    Jethro Muro RN, BSN

## 2021-01-20 LAB
T4 FREE SERPL-MCNC: 1.42 NG/DL (ref 0.76–1.46)
TSH SERPL DL<=0.005 MIU/L-ACNC: 0.06 MU/L (ref 0.4–4)

## 2021-01-20 RX ORDER — LEVOTHYROXINE SODIUM 112 UG/1
112 TABLET ORAL DAILY
Qty: 90 TABLET | Refills: 0 | Status: SHIPPED | OUTPATIENT
Start: 2021-01-20 | End: 2021-04-15

## 2021-01-20 NOTE — RESULT ENCOUNTER NOTE
Scar Lee,    I just wanted to let you know that your lab results have been reviewed and are attached.    - Your TSH was LOW but your T4 was normal, suggesting your thryoid medication dose may be too high.  I have sent in a lower medication dose to your pharmacy.  We should recheck your labs in 6-8 weeks to make sure we are now at the correct dose. This can be done at any [facility] if another location is more convient for you.    Please let me know if you have any questions and have a great week!    Sincerely,    Melina Tran PA-C    [facility]  [address] [place] [address]  Clinic Phone: [phone]

## 2021-04-15 ENCOUNTER — TELEPHONE (OUTPATIENT)
Dept: FAMILY MEDICINE | Facility: CLINIC | Age: 51
End: 2021-04-15

## 2021-04-15 DIAGNOSIS — E03.9 ACQUIRED HYPOTHYROIDISM: ICD-10-CM

## 2021-04-15 LAB
T4 FREE SERPL-MCNC: 1.16 NG/DL (ref 0.76–1.46)
TSH SERPL DL<=0.005 MIU/L-ACNC: 0.14 MU/L (ref 0.4–4)

## 2021-04-15 PROCEDURE — 84443 ASSAY THYROID STIM HORMONE: CPT | Performed by: PHYSICIAN ASSISTANT

## 2021-04-15 PROCEDURE — 84439 ASSAY OF FREE THYROXINE: CPT | Performed by: PHYSICIAN ASSISTANT

## 2021-04-15 PROCEDURE — 36415 COLL VENOUS BLD VENIPUNCTURE: CPT | Performed by: PHYSICIAN ASSISTANT

## 2021-04-15 RX ORDER — LEVOTHYROXINE SODIUM 112 UG/1
112 TABLET ORAL DAILY
Qty: 90 TABLET | Refills: 0 | Status: SHIPPED | OUTPATIENT
Start: 2021-04-15 | End: 2021-04-16

## 2021-04-15 NOTE — TELEPHONE ENCOUNTER
Reason for Call:  Other prescription    Detailed comments: Patient would like to change the pharmacy for her levothyroxine prescription. She would like to have it sent to [facility] in [place] on [address]. Please call patient with any questions. Labs were drawn today 4/15/21 at [facility].    Phone Number Patient can be reached at: Work number on file:    Telephone Information:   Mobile [phone]       Best Time: Anytime    Can we leave a detailed message on this number? YES    Call taken on 4/15/2021 at 4:05 PM by Annel Rosario

## 2021-04-16 RX ORDER — LEVOTHYROXINE SODIUM 100 UG/1
100 TABLET ORAL DAILY
Qty: 90 TABLET | Refills: 0 | Status: SHIPPED | OUTPATIENT
Start: 2021-04-16 | End: 2021-06-30

## 2021-04-16 NOTE — RESULT ENCOUNTER NOTE
Scar Lee,    I just wanted to let you know that your lab results have been reviewed and are attached.    - Your TSH was LOW but your T4 was normal, suggesting your thryoid medication dose may be too high.  I have sent in a lower medication dose to your pharmacy.  We should recheck your labs in 6-8 weeks to make sure we are now at the correct dose. This can be done at any [facility] if another location is more convient for you.    Please let me know if you have any questions and have a great week!    Sincerely,    Melina Tran PA-C    [facility]  [address] [place] [address]  Clinic Phone: [phone]

## 2021-06-28 ENCOUNTER — TELEPHONE (OUTPATIENT)
Dept: FAMILY MEDICINE | Facility: CLINIC | Age: 51
End: 2021-06-28

## 2021-06-28 ENCOUNTER — OFFICE VISIT (OUTPATIENT)
Dept: FAMILY MEDICINE | Facility: CLINIC | Age: 51
End: 2021-06-28
Payer: COMMERCIAL

## 2021-06-28 VITALS
SYSTOLIC BLOOD PRESSURE: 120 MMHG | HEART RATE: 78 BPM | DIASTOLIC BLOOD PRESSURE: 72 MMHG | HEIGHT: 67 IN | BODY MASS INDEX: 25.27 KG/M2 | RESPIRATION RATE: 16 BRPM | WEIGHT: 161 LBS | TEMPERATURE: 98.8 F | OXYGEN SATURATION: 98 %

## 2021-06-28 DIAGNOSIS — M25.559 HIP PAIN: ICD-10-CM

## 2021-06-28 DIAGNOSIS — M79.641 BILATERAL HAND PAIN: ICD-10-CM

## 2021-06-28 DIAGNOSIS — E03.9 ACQUIRED HYPOTHYROIDISM: Primary | Chronic | ICD-10-CM

## 2021-06-28 DIAGNOSIS — M79.642 BILATERAL HAND PAIN: ICD-10-CM

## 2021-06-28 DIAGNOSIS — M79.641 BILATERAL HAND PAIN: Primary | ICD-10-CM

## 2021-06-28 DIAGNOSIS — M79.642 BILATERAL HAND PAIN: Primary | ICD-10-CM

## 2021-06-28 LAB
CRP SERPL-MCNC: <2.9 MG/L (ref 0–8)
ERYTHROCYTE [SEDIMENTATION RATE] IN BLOOD BY WESTERGREN METHOD: 9 MM/H (ref 0–30)

## 2021-06-28 PROCEDURE — 99214 OFFICE O/P EST MOD 30 MIN: CPT | Performed by: PHYSICIAN ASSISTANT

## 2021-06-28 PROCEDURE — 84443 ASSAY THYROID STIM HORMONE: CPT | Performed by: PHYSICIAN ASSISTANT

## 2021-06-28 PROCEDURE — 86140 C-REACTIVE PROTEIN: CPT | Performed by: PHYSICIAN ASSISTANT

## 2021-06-28 PROCEDURE — 86039 ANTINUCLEAR ANTIBODIES (ANA): CPT | Performed by: PHYSICIAN ASSISTANT

## 2021-06-28 PROCEDURE — 86038 ANTINUCLEAR ANTIBODIES: CPT | Performed by: PHYSICIAN ASSISTANT

## 2021-06-28 PROCEDURE — 36415 COLL VENOUS BLD VENIPUNCTURE: CPT | Performed by: PHYSICIAN ASSISTANT

## 2021-06-28 PROCEDURE — 85652 RBC SED RATE AUTOMATED: CPT | Performed by: PHYSICIAN ASSISTANT

## 2021-06-28 ASSESSMENT — MIFFLIN-ST. JEOR: SCORE: 1376.88

## 2021-06-28 NOTE — PROGRESS NOTES
"    Assessment & Plan     Acquired hypothyroidism  - TSH with free T4 reflex    Bilateral hand pain  Will follow up with Dr. Barbosa for second opinion, referral provided.  She does not want to do PT at this time.  - Orthopedic  Referral; Future  - Erythrocyte sedimentation rate auto    Hip pain  Discussed possible polymyalgia rheumatica.  Will check labs today.  Discussed likely not related to menopause.  - CRP inflammation  - Anti Nuclear Daya IgG by IFA with Reflex  - Erythrocyte sedimentation rate auto             BMI:   Estimated body mass index is 25.03 kg/m  as calculated from the following:    Height as of this encounter: 1.708 m (5' 7.25\").    Weight as of this encounter: 73 kg (161 lb).           Return in about 2 weeks (around 7/12/2021) for Specialist Appt as referred.    Sondra Tran PA-C  [facility]    Phil Lee is a 50 year old who presents for the following health issues     Musculoskeletal Problem         Musculoskeletal problem/pain  Onset/Duration: 1 year ago  Description  Location: hands and hips - left and right   Joint Swelling: no  Redness: no  Pain: YES  Warmth: no  Intensity:  mild, severe  Progression of Symptoms:  worsening  Accompanying signs and symptoms:   Fevers: no  Numbness/tingling/weakness: no  History  Trauma to the area: no  Recent illness:  no  Previous similar problem: YES  Previous evaluation:  YES  Precipitating or alleviating factors:  Aggravating factors include: overuse  Therapies tried and outcome: nothing  Additional complaints: None  HPI additional notes: Rosa presents today with   Chief Complaint   Patient presents with     Musculoskeletal Problem   Right thumb PIP joint gets stuck and hurts and the thumb is flexed and hand looks flexed too.  She is right handed.  Has trouble flexing fingers in the morning. Ache all the time but the pain is not severe.  Left is starting to do the same thing.           Review of " "Systems   C: Negative for fever, chills, recent change in weight  Skin: Negative for worrisome rashes or lesions  Resp: Negative for significant cough or SOB  CV: Negative for chest pain or peripheral edema  GI: Negative for nausea, abdominal pain, heartburn, or change in bowel habits  MS: Positive for bilateral hip, bilateral hand pain  NEURO: NEGATIVE for numbness, tingling, or radicular pain.  P: Negative for changes in mood or affect  ROS otherwise negative.      Objective    /72 (Cuff Size: Adult Regular)   Pulse 78   Temp 98.8  F (37.1  C)   Resp 16   Ht 1.708 m (5' 7.25\")   Wt 73 kg (161 lb)   SpO2 98%   BMI 25.03 kg/m    Body mass index is 25.03 kg/m .  Physical Exam     Physical Exam   GENERAL: healthy, alert, in no acute distress  HENT: Mucous mebranes moist.  MS: no gross deformities noted.  No CVA tenderness. No paralumbar tenderness.  SKIN: no suspicious lesions, no rashes  PSYCH: Alert and oriented times 3;  Able to articulate logical thoughts. Affect is normal.     "

## 2021-06-28 NOTE — TELEPHONE ENCOUNTER
Patient requesting celebrex for intermittent pain. Ibuprofen to hard on her stomach. Patient can be reached on my chart.

## 2021-06-29 LAB — TSH SERPL DL<=0.005 MIU/L-ACNC: 0.77 MU/L (ref 0.4–4)

## 2021-06-29 RX ORDER — CELECOXIB 200 MG/1
200 CAPSULE ORAL DAILY
Qty: 90 CAPSULE | Refills: 1 | Status: SHIPPED | OUTPATIENT
Start: 2021-06-29

## 2021-06-29 NOTE — TELEPHONE ENCOUNTER
[employer] message marked as read by patient at 12:18 PM on 6/29/2021.     Shola BERMAN RN

## 2021-06-30 ENCOUNTER — MYC MEDICAL ADVICE (OUTPATIENT)
Dept: FAMILY MEDICINE | Facility: CLINIC | Age: 51
End: 2021-06-30

## 2021-06-30 DIAGNOSIS — E03.9 ACQUIRED HYPOTHYROIDISM: ICD-10-CM

## 2021-06-30 LAB
ANA PAT SER IF-IMP: ABNORMAL
ANA SER QL IF: POSITIVE
ANA TITR SER IF: ABNORMAL {TITER}

## 2021-06-30 RX ORDER — LEVOTHYROXINE SODIUM 100 UG/1
100 TABLET ORAL DAILY
Qty: 90 TABLET | Refills: 3 | Status: SHIPPED | OUTPATIENT
Start: 2021-06-30

## 2021-06-30 NOTE — TELEPHONE ENCOUNTER
Levothyroxine prescription approved per [facility] Refill Protocol.  Shola BERMAN RN

## 2021-09-26 ENCOUNTER — HEALTH MAINTENANCE LETTER (OUTPATIENT)
Age: 51
End: 2021-09-26

## 2022-01-16 ENCOUNTER — HEALTH MAINTENANCE LETTER (OUTPATIENT)
Age: 52
End: 2022-01-16

## 2022-03-13 ENCOUNTER — HEALTH MAINTENANCE LETTER (OUTPATIENT)
Age: 52
End: 2022-03-13

## 2022-03-21 ENCOUNTER — ANCILLARY PROCEDURE (OUTPATIENT)
Dept: MAMMOGRAPHY | Facility: CLINIC | Age: 52
End: 2022-03-21
Attending: PHYSICIAN ASSISTANT
Payer: COMMERCIAL

## 2022-03-21 DIAGNOSIS — Z12.31 VISIT FOR SCREENING MAMMOGRAM: ICD-10-CM

## 2022-03-21 PROCEDURE — 77067 SCR MAMMO BI INCL CAD: CPT | Mod: TC | Performed by: RADIOLOGY

## 2022-03-21 PROCEDURE — 77063 BREAST TOMOSYNTHESIS BI: CPT | Mod: TC | Performed by: RADIOLOGY

## 2023-04-23 ENCOUNTER — HEALTH MAINTENANCE LETTER (OUTPATIENT)
Age: 53
End: 2023-04-23

## 2023-06-02 ENCOUNTER — HEALTH MAINTENANCE LETTER (OUTPATIENT)
Age: 53
End: 2023-06-02

## 2023-07-09 ENCOUNTER — OFFICE VISIT (OUTPATIENT)
Dept: FAMILY MEDICINE | Facility: CLINIC | Age: 53
End: 2023-07-09
Payer: COMMERCIAL

## 2023-07-09 VITALS
HEART RATE: 90 BPM | OXYGEN SATURATION: 97 % | SYSTOLIC BLOOD PRESSURE: 120 MMHG | RESPIRATION RATE: 16 BRPM | BODY MASS INDEX: 26.16 KG/M2 | WEIGHT: 168.3 LBS | DIASTOLIC BLOOD PRESSURE: 77 MMHG

## 2023-07-09 DIAGNOSIS — S40.861A INSECT BITE OF RIGHT UPPER EXTREMITY WITH INFECTION, INITIAL ENCOUNTER: Primary | ICD-10-CM
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
## 2023-07-09 DIAGNOSIS — L08.9 INSECT BITE OF RIGHT UPPER EXTREMITY WITH INFECTION, INITIAL ENCOUNTER: Primary | ICD-10-CM

## 2023-07-09 PROCEDURE — 99214 OFFICE O/P EST MOD 30 MIN: CPT | Performed by: FAMILY MEDICINE

## 2023-07-09 RX ORDER — DIPHENHYDRAMINE HCL 25 MG
25 TABLET ORAL EVERY 6 HOURS PRN
Qty: 30 TABLET | Refills: 0 | Status: SHIPPED | OUTPATIENT
Start: 2023-07-09

## 2023-07-09 RX ORDER — LORATADINE 10 MG/1
10 TABLET ORAL DAILY
Qty: 30 TABLET | Refills: 0 | Status: SHIPPED | OUTPATIENT
Start: 2023-07-09

## 2023-07-09 RX ORDER — DOXYCYCLINE HYCLATE 100 MG
100 TABLET ORAL 2 TIMES DAILY
Qty: 20 TABLET | Refills: 0 | Status: SHIPPED | OUTPATIENT
Start: 2023-07-09 | End: 2023-07-19

## 2023-07-09 NOTE — PROGRESS NOTES
ASSESSMENT/PLAN:      ICD-10-CM    1. Insect bite of right upper extremity with infection, initial encounter  S40.861A doxycycline hyclate (VIBRA-TABS) 100 MG tablet    L08.9 diphenhydrAMINE (BENADRYL) 25 MG tablet    W57.XXXA loratadine (CLARITIN) 10 MG tablet          Patient Instructions     Start  antibiotic -doxycycline 2 times a day for 10 days always take with food to prevent nausea vomiting     Start Claritin 10 mg a day for 30 days     Take benadryl ( diphenhydramine) at bedtime will help with congestion and cough will make you sleepy      If fever, increase in redness, streaking up your arm, worsening pain to ER    Elevate arm-continue ice or heat which ever feels better to help with swelling        Follow up with your primary care provider or clinic in about 2-3 days  if your symptoms do not improve          Reviewed medication instructions and side effects. Follow up if experiences side effects.     I reviewed supportive care, otc meds to use if needed, expected course, and signs of concern.  Follow up as needed or if she does not improve within  1-2 days or if worsens in any way.  Reviewed red flag symptoms and is to go to the ER if experiences any of these.     The use of Dragon/[employer] dictation services may have been used to construct the content in this note; any grammatical or spelling errors are non-intentional. Please contact the author of this note directly if you are in need of any clarification.                  Patient presents with:  Insect Bites: Wasp bite 2 days ago on R forearm, redness and swelling is spreading       Subjective     Rosa Urbina is a 52 year old female who presents to clinic today for the following health issues:    HPI       Insect bite     Duration: 2 days ago  Description  Location:   R forearm, -stung by a wasp    Itching: no  Accompanying signs and symptoms: Initially swelling/erythema  around site of the wasp sting, by the next day redness around the  area of the sting had increase,then today, began to track up her arm to just below the antecubital and down toward there wrist, no joint pain,  no pain in wrist/elbow ,no fever,     Area is tender/warm to touch small blisters have formed on the skin around site of bite  History (similar episodes/previous evaluation): None  Precipitating or alleviating factors:  New exposures:  wasp sting as noted   Recent travel: no    Therapies tried and outcome: Benadryl/diphenhydramine -  last night, ice to area, minimal relief       Past Medical History:   Diagnosis Date    H/O Graves' disease 3/15/07    s/p radiation     Social History     Tobacco Use    Smoking status: Never    Smokeless tobacco: Never   Substance Use Topics    Alcohol use: Yes     Comment: rarely       Current Outpatient Medications   Medication Sig Dispense Refill    diphenhydrAMINE (BENADRYL) 25 MG tablet Take 1 tablet (25 mg) by mouth every 6 hours as needed for itching or allergies 30 tablet 0    doxycycline hyclate (VIBRA-TABS) 100 MG tablet Take 1 tablet (100 mg) by mouth 2 times daily for 10 days 20 tablet 0    levothyroxine (SYNTHROID/LEVOTHROID) 100 MCG tablet Take 1 tablet (100 mcg) by mouth daily 90 tablet 3    loratadine (CLARITIN) 10 MG tablet Take 1 tablet (10 mg) by mouth daily 30 tablet 0    celecoxib (CELEBREX) 200 MG capsule Take 1 capsule (200 mg) by mouth daily (Patient not taking: Reported on 7/9/2023) 90 capsule 1     Allergies   Allergen Reactions    Erythromycin Nausea             ROS are negative, except as otherwise noted HPI      Objective    /77   Pulse 90   Resp 16   Wt 76.3 kg (168 lb 4.8 oz)   LMP  (LMP Unknown)   SpO2 97%   BMI 26.16 kg/m    Body mass index is 26.16 kg/m .  Physical Exam   GENERAL: healthy, alert and no distress    MS: R forearm-mid volar surface a few small clear vesicles in center of a  area of  erythema extending down the volar surface of the forearm from just above the wrist all the way up the  arm to just the lower  border  of the antecubital area  small streak of erythema 1-2 cm across the lower border of the antecubital  area  No swelling of the elbow/wrist no pain with ROM of elbow wrist, pain in forearm with movement of arm, or pressure or touching the erythematous area of forearm      NEURO: Normal strength and tone, mentation intact and speech normal

## 2023-07-10 NOTE — PATIENT INSTRUCTIONS
Start  antibiotic -doxycycline 2 times a day for 10 days always take with food to prevent nausea vomiting     Start Claritin 10 mg a day for 30 days     Take benadryl ( diphenhydramine) at bedtime will help with congestion and cough will make you sleepy      If fever, increase in redness, streaking up your arm, worsening pain to ER    Elevate arm-continue ice or heat which ever feels better to help with swelling        Follow up with your primary care provider or clinic in about 2-3 days  if your symptoms do not improve       Restrict keyboarding until swelling redness subsides and no  pain with keyboarding.

## 2024-06-30 ENCOUNTER — HEALTH MAINTENANCE LETTER (OUTPATIENT)
Age: 54
End: 2024-06-30

## 2024-09-08 ENCOUNTER — HEALTH MAINTENANCE LETTER (OUTPATIENT)
Age: 54
End: 2024-09-08

## 2025-07-13 ENCOUNTER — HEALTH MAINTENANCE LETTER (OUTPATIENT)
Age: 55
End: 2025-07-13